# Patient Record
Sex: MALE | Race: WHITE | Employment: OTHER | URBAN - METROPOLITAN AREA
[De-identification: names, ages, dates, MRNs, and addresses within clinical notes are randomized per-mention and may not be internally consistent; named-entity substitution may affect disease eponyms.]

---

## 2022-01-01 ENCOUNTER — HOSPITAL ENCOUNTER (INPATIENT)
Age: 87
LOS: 2 days | DRG: 951 | End: 2022-10-02
Attending: FAMILY MEDICINE | Admitting: FAMILY MEDICINE
Payer: OTHER MISCELLANEOUS

## 2022-01-01 ENCOUNTER — APPOINTMENT (OUTPATIENT)
Dept: GENERAL RADIOLOGY | Age: 87
DRG: 070 | End: 2022-01-01
Attending: EMERGENCY MEDICINE
Payer: MEDICARE

## 2022-01-01 ENCOUNTER — APPOINTMENT (OUTPATIENT)
Dept: ULTRASOUND IMAGING | Age: 87
DRG: 070 | End: 2022-01-01
Attending: HOSPITALIST
Payer: MEDICARE

## 2022-01-01 ENCOUNTER — HOSPICE ADMISSION (OUTPATIENT)
Dept: HOSPICE | Facility: HOSPICE | Age: 87
End: 2022-01-01
Payer: MEDICARE

## 2022-01-01 ENCOUNTER — APPOINTMENT (OUTPATIENT)
Dept: CT IMAGING | Age: 87
DRG: 070 | End: 2022-01-01
Attending: STUDENT IN AN ORGANIZED HEALTH CARE EDUCATION/TRAINING PROGRAM
Payer: MEDICARE

## 2022-01-01 ENCOUNTER — APPOINTMENT (OUTPATIENT)
Dept: CT IMAGING | Age: 87
DRG: 070 | End: 2022-01-01
Attending: EMERGENCY MEDICINE
Payer: MEDICARE

## 2022-01-01 ENCOUNTER — HOSPITAL ENCOUNTER (INPATIENT)
Age: 87
LOS: 5 days | Discharge: HOSPICE | DRG: 070 | End: 2022-09-30
Attending: EMERGENCY MEDICINE | Admitting: INTERNAL MEDICINE
Payer: MEDICARE

## 2022-01-01 VITALS
WEIGHT: 136.91 LBS | OXYGEN SATURATION: 96 % | TEMPERATURE: 97.8 F | HEART RATE: 85 BPM | HEIGHT: 74 IN | SYSTOLIC BLOOD PRESSURE: 105 MMHG | RESPIRATION RATE: 18 BRPM | DIASTOLIC BLOOD PRESSURE: 77 MMHG | BODY MASS INDEX: 17.57 KG/M2

## 2022-01-01 VITALS
SYSTOLIC BLOOD PRESSURE: 96 MMHG | WEIGHT: 136.69 LBS | HEIGHT: 74 IN | BODY MASS INDEX: 17.54 KG/M2 | RESPIRATION RATE: 22 BRPM | OXYGEN SATURATION: 90 % | HEART RATE: 112 BPM | TEMPERATURE: 97.8 F | DIASTOLIC BLOOD PRESSURE: 60 MMHG

## 2022-01-01 DIAGNOSIS — R53.81 DEBILITY: ICD-10-CM

## 2022-01-01 DIAGNOSIS — R65.20 SEPSIS WITH ENCEPHALOPATHY WITHOUT SEPTIC SHOCK, DUE TO UNSPECIFIED ORGANISM (HCC): Primary | ICD-10-CM

## 2022-01-01 DIAGNOSIS — A41.9 SEPSIS WITH ENCEPHALOPATHY WITHOUT SEPTIC SHOCK, DUE TO UNSPECIFIED ORGANISM (HCC): Primary | ICD-10-CM

## 2022-01-01 DIAGNOSIS — G93.40 ENCEPHALOPATHY: ICD-10-CM

## 2022-01-01 DIAGNOSIS — Z51.5 PALLIATIVE CARE ENCOUNTER: ICD-10-CM

## 2022-01-01 DIAGNOSIS — G93.40 SEPSIS WITH ENCEPHALOPATHY WITHOUT SEPTIC SHOCK, DUE TO UNSPECIFIED ORGANISM (HCC): Primary | ICD-10-CM

## 2022-01-01 DIAGNOSIS — N17.9 ACUTE KIDNEY INJURY (HCC): ICD-10-CM

## 2022-01-01 LAB
ALBUMIN SERPL-MCNC: 1.8 G/DL (ref 3.5–5)
ALBUMIN SERPL-MCNC: 3.6 G/DL (ref 3.5–5)
ALBUMIN/GLOB SERPL: 1.1 {RATIO} (ref 1.1–2.2)
ALP SERPL-CCNC: 49 U/L (ref 45–117)
ALT SERPL-CCNC: 17 U/L (ref 12–78)
ANION GAP SERPL CALC-SCNC: 10 MMOL/L (ref 5–15)
ANION GAP SERPL CALC-SCNC: 11 MMOL/L (ref 5–15)
ANION GAP SERPL CALC-SCNC: 14 MMOL/L (ref 5–15)
ANION GAP SERPL CALC-SCNC: 7 MMOL/L (ref 5–15)
ANION GAP SERPL CALC-SCNC: 7 MMOL/L (ref 5–15)
APPEARANCE UR: ABNORMAL
AST SERPL-CCNC: 14 U/L (ref 15–37)
BACTERIA SPEC CULT: NORMAL
BACTERIA URNS QL MICRO: NEGATIVE /HPF
BASOPHILS # BLD: 0 K/UL (ref 0–0.1)
BASOPHILS NFR BLD: 0 % (ref 0–1)
BILIRUB SERPL-MCNC: 0.7 MG/DL (ref 0.2–1)
BILIRUB UR QL: NEGATIVE
BUN SERPL-MCNC: 25 MG/DL (ref 6–20)
BUN SERPL-MCNC: 59 MG/DL (ref 6–20)
BUN SERPL-MCNC: 75 MG/DL (ref 6–20)
BUN SERPL-MCNC: 83 MG/DL (ref 6–20)
BUN SERPL-MCNC: 96 MG/DL (ref 6–20)
BUN/CREAT SERPL: 14 (ref 12–20)
BUN/CREAT SERPL: 14 (ref 12–20)
BUN/CREAT SERPL: 15 (ref 12–20)
BUN/CREAT SERPL: 16 (ref 12–20)
BUN/CREAT SERPL: 19 (ref 12–20)
CALCIUM SERPL-MCNC: 7.6 MG/DL (ref 8.5–10.1)
CALCIUM SERPL-MCNC: 7.9 MG/DL (ref 8.5–10.1)
CALCIUM SERPL-MCNC: 8 MG/DL (ref 8.5–10.1)
CALCIUM SERPL-MCNC: 8.1 MG/DL (ref 8.5–10.1)
CALCIUM SERPL-MCNC: 8.2 MG/DL (ref 8.5–10.1)
CAOX CRY URNS QL MICRO: ABNORMAL
CHLORIDE SERPL-SCNC: 100 MMOL/L (ref 97–108)
CHLORIDE SERPL-SCNC: 104 MMOL/L (ref 97–108)
CHLORIDE SERPL-SCNC: 106 MMOL/L (ref 97–108)
CHLORIDE SERPL-SCNC: 111 MMOL/L (ref 97–108)
CHLORIDE SERPL-SCNC: 114 MMOL/L (ref 97–108)
CO2 SERPL-SCNC: 19 MMOL/L (ref 21–32)
CO2 SERPL-SCNC: 20 MMOL/L (ref 21–32)
CO2 SERPL-SCNC: 21 MMOL/L (ref 21–32)
CO2 SERPL-SCNC: 22 MMOL/L (ref 21–32)
CO2 SERPL-SCNC: 24 MMOL/L (ref 21–32)
COLOR UR: ABNORMAL
CREAT SERPL-MCNC: 1.32 MG/DL (ref 0.7–1.3)
CREAT SERPL-MCNC: 3.76 MG/DL (ref 0.7–1.3)
CREAT SERPL-MCNC: 4.84 MG/DL (ref 0.7–1.3)
CREAT SERPL-MCNC: 5.92 MG/DL (ref 0.7–1.3)
CREAT SERPL-MCNC: 6.66 MG/DL (ref 0.7–1.3)
DIFFERENTIAL METHOD BLD: ABNORMAL
EOSINOPHIL # BLD: 0 K/UL (ref 0–0.4)
EOSINOPHIL # BLD: 0.2 K/UL (ref 0–0.4)
EOSINOPHIL NFR BLD: 0 % (ref 0–7)
EOSINOPHIL NFR BLD: 2 % (ref 0–7)
EPITH CASTS URNS QL MICRO: ABNORMAL /LPF
ERYTHROCYTE [DISTWIDTH] IN BLOOD BY AUTOMATED COUNT: 12.9 % (ref 11.5–14.5)
ERYTHROCYTE [DISTWIDTH] IN BLOOD BY AUTOMATED COUNT: 13.3 % (ref 11.5–14.5)
ERYTHROCYTE [DISTWIDTH] IN BLOOD BY AUTOMATED COUNT: 13.3 % (ref 11.5–14.5)
ERYTHROCYTE [DISTWIDTH] IN BLOOD BY AUTOMATED COUNT: 13.5 % (ref 11.5–14.5)
GLOBULIN SER CALC-MCNC: 3.3 G/DL (ref 2–4)
GLUCOSE BLD STRIP.AUTO-MCNC: 97 MG/DL (ref 65–117)
GLUCOSE SERPL-MCNC: 107 MG/DL (ref 65–100)
GLUCOSE SERPL-MCNC: 79 MG/DL (ref 65–100)
GLUCOSE SERPL-MCNC: 88 MG/DL (ref 65–100)
GLUCOSE SERPL-MCNC: 93 MG/DL (ref 65–100)
GLUCOSE SERPL-MCNC: 96 MG/DL (ref 65–100)
GLUCOSE UR STRIP.AUTO-MCNC: 100 MG/DL
HCT VFR BLD AUTO: 35.9 % (ref 36.6–50.3)
HCT VFR BLD AUTO: 39 % (ref 36.6–50.3)
HCT VFR BLD AUTO: 40.3 % (ref 36.6–50.3)
HCT VFR BLD AUTO: 44.2 % (ref 36.6–50.3)
HGB BLD-MCNC: 12.1 G/DL (ref 12.1–17)
HGB BLD-MCNC: 13.1 G/DL (ref 12.1–17)
HGB BLD-MCNC: 13.1 G/DL (ref 12.1–17)
HGB BLD-MCNC: 14.4 G/DL (ref 12.1–17)
HGB UR QL STRIP: ABNORMAL
IMM GRANULOCYTES # BLD AUTO: 0 K/UL (ref 0–0.04)
IMM GRANULOCYTES # BLD AUTO: 0.1 K/UL (ref 0–0.04)
IMM GRANULOCYTES NFR BLD AUTO: 0 % (ref 0–0.5)
IMM GRANULOCYTES NFR BLD AUTO: 1 % (ref 0–0.5)
KETONES UR QL STRIP.AUTO: 15 MG/DL
LACTATE BLD-SCNC: 0.87 MMOL/L (ref 0.4–2)
LACTATE SERPL-SCNC: 2.6 MMOL/L (ref 0.4–2)
LEUKOCYTE ESTERASE UR QL STRIP.AUTO: NEGATIVE
LIPASE SERPL-CCNC: 173 U/L (ref 73–393)
LYMPHOCYTES # BLD: 0.7 K/UL (ref 0.8–3.5)
LYMPHOCYTES # BLD: 0.7 K/UL (ref 0.8–3.5)
LYMPHOCYTES # BLD: 1 K/UL (ref 0.8–3.5)
LYMPHOCYTES # BLD: 1 K/UL (ref 0.8–3.5)
LYMPHOCYTES NFR BLD: 6 % (ref 12–49)
LYMPHOCYTES NFR BLD: 7 % (ref 12–49)
LYMPHOCYTES NFR BLD: 8 % (ref 12–49)
LYMPHOCYTES NFR BLD: 9 % (ref 12–49)
MCH RBC QN AUTO: 30.3 PG (ref 26–34)
MCH RBC QN AUTO: 30.5 PG (ref 26–34)
MCH RBC QN AUTO: 30.6 PG (ref 26–34)
MCH RBC QN AUTO: 30.9 PG (ref 26–34)
MCHC RBC AUTO-ENTMCNC: 32.5 G/DL (ref 30–36.5)
MCHC RBC AUTO-ENTMCNC: 32.6 G/DL (ref 30–36.5)
MCHC RBC AUTO-ENTMCNC: 33.6 G/DL (ref 30–36.5)
MCHC RBC AUTO-ENTMCNC: 33.7 G/DL (ref 30–36.5)
MCV RBC AUTO: 90.9 FL (ref 80–99)
MCV RBC AUTO: 91.8 FL (ref 80–99)
MCV RBC AUTO: 93.1 FL (ref 80–99)
MCV RBC AUTO: 94.2 FL (ref 80–99)
MONOCYTES # BLD: 0.7 K/UL (ref 0–1)
MONOCYTES # BLD: 1 K/UL (ref 0–1)
MONOCYTES # BLD: 1 K/UL (ref 0–1)
MONOCYTES # BLD: 1.1 K/UL (ref 0–1)
MONOCYTES NFR BLD: 10 % (ref 5–13)
MONOCYTES NFR BLD: 6 % (ref 5–13)
MONOCYTES NFR BLD: 8 % (ref 5–13)
MONOCYTES NFR BLD: 9 % (ref 5–13)
NEUTS SEG # BLD: 10.7 K/UL (ref 1.8–8)
NEUTS SEG # BLD: 7.6 K/UL (ref 1.8–8)
NEUTS SEG # BLD: 9.1 K/UL (ref 1.8–8)
NEUTS SEG # BLD: 9.5 K/UL (ref 1.8–8)
NEUTS SEG NFR BLD: 80 % (ref 32–75)
NEUTS SEG NFR BLD: 83 % (ref 32–75)
NEUTS SEG NFR BLD: 84 % (ref 32–75)
NEUTS SEG NFR BLD: 85 % (ref 32–75)
NITRITE UR QL STRIP.AUTO: NEGATIVE
NRBC # BLD: 0 K/UL (ref 0–0.01)
NRBC BLD-RTO: 0 PER 100 WBC
PH UR STRIP: 6 [PH] (ref 5–8)
PHOSPHATE SERPL-MCNC: 4.2 MG/DL (ref 2.6–4.7)
PLATELET # BLD AUTO: 196 K/UL (ref 150–400)
PLATELET # BLD AUTO: 217 K/UL (ref 150–400)
PLATELET # BLD AUTO: 217 K/UL (ref 150–400)
PLATELET # BLD AUTO: 282 K/UL (ref 150–400)
PMV BLD AUTO: 10.1 FL (ref 8.9–12.9)
PMV BLD AUTO: 10.2 FL (ref 8.9–12.9)
PMV BLD AUTO: 10.5 FL (ref 8.9–12.9)
PMV BLD AUTO: 9.9 FL (ref 8.9–12.9)
POTASSIUM SERPL-SCNC: 4.3 MMOL/L (ref 3.5–5.1)
POTASSIUM SERPL-SCNC: 4.4 MMOL/L (ref 3.5–5.1)
POTASSIUM SERPL-SCNC: 4.4 MMOL/L (ref 3.5–5.1)
POTASSIUM SERPL-SCNC: 4.5 MMOL/L (ref 3.5–5.1)
POTASSIUM SERPL-SCNC: 4.9 MMOL/L (ref 3.5–5.1)
PROCALCITONIN SERPL-MCNC: 3.9 NG/ML
PROT SERPL-MCNC: 6.9 G/DL (ref 6.4–8.2)
PROT UR STRIP-MCNC: 30 MG/DL
RBC # BLD AUTO: 3.91 M/UL (ref 4.1–5.7)
RBC # BLD AUTO: 4.28 M/UL (ref 4.1–5.7)
RBC # BLD AUTO: 4.29 M/UL (ref 4.1–5.7)
RBC # BLD AUTO: 4.75 M/UL (ref 4.1–5.7)
RBC #/AREA URNS HPF: ABNORMAL /HPF (ref 0–5)
RBC MORPH BLD: ABNORMAL
SERVICE CMNT-IMP: NORMAL
SERVICE CMNT-IMP: NORMAL
SODIUM SERPL-SCNC: 136 MMOL/L (ref 136–145)
SODIUM SERPL-SCNC: 137 MMOL/L (ref 136–145)
SODIUM SERPL-SCNC: 137 MMOL/L (ref 136–145)
SODIUM SERPL-SCNC: 138 MMOL/L (ref 136–145)
SODIUM SERPL-SCNC: 142 MMOL/L (ref 136–145)
SP GR UR REFRACTOMETRY: 1.01 (ref 1–1.03)
TROPONIN-HIGH SENSITIVITY: 9 NG/L (ref 0–76)
UR CULT HOLD, URHOLD: NORMAL
UROBILINOGEN UR QL STRIP.AUTO: 0.2 EU/DL (ref 0.2–1)
WBC # BLD AUTO: 10.9 K/UL (ref 4.1–11.1)
WBC # BLD AUTO: 11.2 K/UL (ref 4.1–11.1)
WBC # BLD AUTO: 12.9 K/UL (ref 4.1–11.1)
WBC # BLD AUTO: 9.6 K/UL (ref 4.1–11.1)
WBC URNS QL MICRO: ABNORMAL /HPF (ref 0–4)

## 2022-01-01 PROCEDURE — 92526 ORAL FUNCTION THERAPY: CPT

## 2022-01-01 PROCEDURE — 96374 THER/PROPH/DIAG INJ IV PUSH: CPT

## 2022-01-01 PROCEDURE — 36415 COLL VENOUS BLD VENIPUNCTURE: CPT

## 2022-01-01 PROCEDURE — 65270000046 HC RM TELEMETRY

## 2022-01-01 PROCEDURE — 97535 SELF CARE MNGMENT TRAINING: CPT

## 2022-01-01 PROCEDURE — 74011250636 HC RX REV CODE- 250/636: Performed by: HOSPITALIST

## 2022-01-01 PROCEDURE — C9113 INJ PANTOPRAZOLE SODIUM, VIA: HCPCS | Performed by: HOSPITALIST

## 2022-01-01 PROCEDURE — 74011000250 HC RX REV CODE- 250: Performed by: HOSPITALIST

## 2022-01-01 PROCEDURE — 97165 OT EVAL LOW COMPLEX 30 MIN: CPT

## 2022-01-01 PROCEDURE — 83605 ASSAY OF LACTIC ACID: CPT

## 2022-01-01 PROCEDURE — 97116 GAIT TRAINING THERAPY: CPT

## 2022-01-01 PROCEDURE — 96375 TX/PRO/DX INJ NEW DRUG ADDON: CPT

## 2022-01-01 PROCEDURE — 76770 US EXAM ABDO BACK WALL COMP: CPT

## 2022-01-01 PROCEDURE — 80048 BASIC METABOLIC PNL TOTAL CA: CPT

## 2022-01-01 PROCEDURE — 74011250637 HC RX REV CODE- 250/637: Performed by: FAMILY MEDICINE

## 2022-01-01 PROCEDURE — 85025 COMPLETE CBC W/AUTO DIFF WBC: CPT

## 2022-01-01 PROCEDURE — 74011000250 HC RX REV CODE- 250: Performed by: EMERGENCY MEDICINE

## 2022-01-01 PROCEDURE — 97530 THERAPEUTIC ACTIVITIES: CPT

## 2022-01-01 PROCEDURE — 70450 CT HEAD/BRAIN W/O DYE: CPT

## 2022-01-01 PROCEDURE — 84145 PROCALCITONIN (PCT): CPT

## 2022-01-01 PROCEDURE — 74011000250 HC RX REV CODE- 250: Performed by: INTERNAL MEDICINE

## 2022-01-01 PROCEDURE — 80069 RENAL FUNCTION PANEL: CPT

## 2022-01-01 PROCEDURE — 99285 EMERGENCY DEPT VISIT HI MDM: CPT

## 2022-01-01 PROCEDURE — 74011250636 HC RX REV CODE- 250/636: Performed by: INTERNAL MEDICINE

## 2022-01-01 PROCEDURE — 83690 ASSAY OF LIPASE: CPT

## 2022-01-01 PROCEDURE — 0656 HSPC GENERAL INPATIENT

## 2022-01-01 PROCEDURE — 74011000250 HC RX REV CODE- 250: Performed by: STUDENT IN AN ORGANIZED HEALTH CARE EDUCATION/TRAINING PROGRAM

## 2022-01-01 PROCEDURE — 74011000258 HC RX REV CODE- 258: Performed by: HOSPITALIST

## 2022-01-01 PROCEDURE — 65270000032 HC RM SEMIPRIVATE

## 2022-01-01 PROCEDURE — 74011250636 HC RX REV CODE- 250/636: Performed by: FAMILY MEDICINE

## 2022-01-01 PROCEDURE — 97161 PT EVAL LOW COMPLEX 20 MIN: CPT

## 2022-01-01 PROCEDURE — 74011250636 HC RX REV CODE- 250/636: Performed by: PHYSICAL MEDICINE & REHABILITATION

## 2022-01-01 PROCEDURE — 74011250636 HC RX REV CODE- 250/636: Performed by: STUDENT IN AN ORGANIZED HEALTH CARE EDUCATION/TRAINING PROGRAM

## 2022-01-01 PROCEDURE — 74011000250 HC RX REV CODE- 250: Performed by: FAMILY MEDICINE

## 2022-01-01 PROCEDURE — 81001 URINALYSIS AUTO W/SCOPE: CPT

## 2022-01-01 PROCEDURE — 65270000029 HC RM PRIVATE

## 2022-01-01 PROCEDURE — 84484 ASSAY OF TROPONIN QUANT: CPT

## 2022-01-01 PROCEDURE — 80053 COMPREHEN METABOLIC PANEL: CPT

## 2022-01-01 PROCEDURE — 71045 X-RAY EXAM CHEST 1 VIEW: CPT

## 2022-01-01 PROCEDURE — 92610 EVALUATE SWALLOWING FUNCTION: CPT | Performed by: SPEECH-LANGUAGE PATHOLOGIST

## 2022-01-01 PROCEDURE — 74176 CT ABD & PELVIS W/O CONTRAST: CPT

## 2022-01-01 PROCEDURE — 74011250636 HC RX REV CODE- 250/636: Performed by: NURSE PRACTITIONER

## 2022-01-01 PROCEDURE — 74011250636 HC RX REV CODE- 250/636: Performed by: EMERGENCY MEDICINE

## 2022-01-01 PROCEDURE — 97535 SELF CARE MNGMENT TRAINING: CPT | Performed by: OCCUPATIONAL THERAPIST

## 2022-01-01 PROCEDURE — 87040 BLOOD CULTURE FOR BACTERIA: CPT

## 2022-01-01 PROCEDURE — 82962 GLUCOSE BLOOD TEST: CPT

## 2022-01-01 PROCEDURE — 99223 1ST HOSP IP/OBS HIGH 75: CPT | Performed by: PHYSICAL MEDICINE & REHABILITATION

## 2022-01-01 RX ORDER — HYDROMORPHONE HYDROCHLORIDE 1 MG/ML
0.5 INJECTION, SOLUTION INTRAMUSCULAR; INTRAVENOUS; SUBCUTANEOUS
Status: DISCONTINUED | OUTPATIENT
Start: 2022-01-01 | End: 2022-01-01 | Stop reason: HOSPADM

## 2022-01-01 RX ORDER — HALOPERIDOL 5 MG/ML
2 INJECTION INTRAMUSCULAR
Status: DISCONTINUED | OUTPATIENT
Start: 2022-01-01 | End: 2022-01-01 | Stop reason: HOSPADM

## 2022-01-01 RX ORDER — GLYCOPYRROLATE 0.2 MG/ML
0.2 INJECTION INTRAMUSCULAR; INTRAVENOUS
Status: DISCONTINUED | OUTPATIENT
Start: 2022-01-01 | End: 2022-01-01 | Stop reason: HOSPADM

## 2022-01-01 RX ORDER — LORAZEPAM 2 MG/ML
1 CONCENTRATE ORAL
Status: DISCONTINUED | OUTPATIENT
Start: 2022-01-01 | End: 2022-01-01 | Stop reason: HOSPADM

## 2022-01-01 RX ORDER — ACETAMINOPHEN 325 MG/1
650 TABLET ORAL
Status: DISCONTINUED | OUTPATIENT
Start: 2022-01-01 | End: 2022-01-01 | Stop reason: HOSPADM

## 2022-01-01 RX ORDER — HYDROMORPHONE HYDROCHLORIDE 1 MG/ML
0.5 INJECTION, SOLUTION INTRAMUSCULAR; INTRAVENOUS; SUBCUTANEOUS
Status: DISCONTINUED | OUTPATIENT
Start: 2022-01-01 | End: 2022-01-01

## 2022-01-01 RX ORDER — SODIUM CHLORIDE 0.9 % (FLUSH) 0.9 %
5-40 SYRINGE (ML) INJECTION AS NEEDED
Status: DISCONTINUED | OUTPATIENT
Start: 2022-01-01 | End: 2022-01-01 | Stop reason: HOSPADM

## 2022-01-01 RX ORDER — ACETAMINOPHEN 650 MG/1
650 SUPPOSITORY RECTAL
Status: DISCONTINUED | OUTPATIENT
Start: 2022-01-01 | End: 2022-01-01 | Stop reason: HOSPADM

## 2022-01-01 RX ORDER — DIAZEPAM 10 MG/2ML
10 INJECTION INTRAMUSCULAR ONCE
Status: DISCONTINUED | OUTPATIENT
Start: 2022-01-01 | End: 2022-01-01

## 2022-01-01 RX ORDER — PROCHLORPERAZINE EDISYLATE 5 MG/ML
10 INJECTION INTRAMUSCULAR; INTRAVENOUS
Status: DISCONTINUED | OUTPATIENT
Start: 2022-01-01 | End: 2022-01-01 | Stop reason: HOSPADM

## 2022-01-01 RX ORDER — FACIAL-BODY WIPES
10 EACH TOPICAL DAILY PRN
Status: DISCONTINUED | OUTPATIENT
Start: 2022-01-01 | End: 2022-01-01 | Stop reason: HOSPADM

## 2022-01-01 RX ORDER — ONDANSETRON 2 MG/ML
4 INJECTION INTRAMUSCULAR; INTRAVENOUS
Status: DISCONTINUED | OUTPATIENT
Start: 2022-01-01 | End: 2022-01-01 | Stop reason: HOSPADM

## 2022-01-01 RX ORDER — MIDAZOLAM HYDROCHLORIDE 1 MG/ML
2 INJECTION, SOLUTION INTRAMUSCULAR; INTRAVENOUS
Status: DISCONTINUED | OUTPATIENT
Start: 2022-01-01 | End: 2022-01-01 | Stop reason: HOSPADM

## 2022-01-01 RX ORDER — SODIUM CHLORIDE 0.9 % (FLUSH) 0.9 %
5 SYRINGE (ML) INJECTION AS NEEDED
Status: DISCONTINUED | OUTPATIENT
Start: 2022-01-01 | End: 2022-01-01 | Stop reason: HOSPADM

## 2022-01-01 RX ORDER — BUMETANIDE 0.25 MG/ML
2 INJECTION INTRAMUSCULAR; INTRAVENOUS ONCE
Status: COMPLETED | OUTPATIENT
Start: 2022-01-01 | End: 2022-01-01

## 2022-01-01 RX ORDER — HYDROMORPHONE HYDROCHLORIDE 1 MG/ML
1 INJECTION, SOLUTION INTRAMUSCULAR; INTRAVENOUS; SUBCUTANEOUS
Status: DISCONTINUED | OUTPATIENT
Start: 2022-01-01 | End: 2022-01-01 | Stop reason: HOSPADM

## 2022-01-01 RX ORDER — ONDANSETRON 4 MG/1
4 TABLET, ORALLY DISINTEGRATING ORAL
Status: DISCONTINUED | OUTPATIENT
Start: 2022-01-01 | End: 2022-01-01 | Stop reason: HOSPADM

## 2022-01-01 RX ORDER — SODIUM CHLORIDE 0.9 % (FLUSH) 0.9 %
5-40 SYRINGE (ML) INJECTION EVERY 8 HOURS
Status: DISCONTINUED | OUTPATIENT
Start: 2022-01-01 | End: 2022-01-01 | Stop reason: HOSPADM

## 2022-01-01 RX ORDER — POLYETHYLENE GLYCOL 3350 17 G/17G
17 POWDER, FOR SOLUTION ORAL DAILY PRN
Status: DISCONTINUED | OUTPATIENT
Start: 2022-01-01 | End: 2022-01-01 | Stop reason: HOSPADM

## 2022-01-01 RX ORDER — SODIUM CHLORIDE 9 MG/ML
100 INJECTION, SOLUTION INTRAVENOUS CONTINUOUS
Status: DISPENSED | OUTPATIENT
Start: 2022-01-01 | End: 2022-01-01

## 2022-01-01 RX ORDER — SODIUM CHLORIDE 9 MG/ML
150 INJECTION, SOLUTION INTRAVENOUS CONTINUOUS
Status: DISPENSED | OUTPATIENT
Start: 2022-01-01 | End: 2022-01-01

## 2022-01-01 RX ORDER — LIDOCAINE HYDROCHLORIDE 20 MG/ML
JELLY TOPICAL
Status: COMPLETED | OUTPATIENT
Start: 2022-01-01 | End: 2022-01-01

## 2022-01-01 RX ORDER — ONDANSETRON 2 MG/ML
4 INJECTION INTRAMUSCULAR; INTRAVENOUS
Status: COMPLETED | OUTPATIENT
Start: 2022-01-01 | End: 2022-01-01

## 2022-01-01 RX ORDER — KETOROLAC TROMETHAMINE 30 MG/ML
15 INJECTION, SOLUTION INTRAMUSCULAR; INTRAVENOUS
Status: DISCONTINUED | OUTPATIENT
Start: 2022-01-01 | End: 2022-01-01 | Stop reason: HOSPADM

## 2022-01-01 RX ORDER — LEVOFLOXACIN 5 MG/ML
750 INJECTION, SOLUTION INTRAVENOUS ONCE
Status: COMPLETED | OUTPATIENT
Start: 2022-01-01 | End: 2022-01-01

## 2022-01-01 RX ORDER — DIAZEPAM 10 MG/2ML
10 INJECTION INTRAMUSCULAR EVERY 6 HOURS
Status: DISCONTINUED | OUTPATIENT
Start: 2022-01-01 | End: 2022-01-01 | Stop reason: HOSPADM

## 2022-01-01 RX ORDER — BALSAM PERU/CASTOR OIL
OINTMENT (GRAM) TOPICAL 2 TIMES DAILY
Status: DISCONTINUED | OUTPATIENT
Start: 2022-01-01 | End: 2022-01-01 | Stop reason: HOSPADM

## 2022-01-01 RX ORDER — DIAZEPAM 10 MG/2ML
5 INJECTION INTRAMUSCULAR EVERY 6 HOURS
Status: DISCONTINUED | OUTPATIENT
Start: 2022-01-01 | End: 2022-01-01

## 2022-01-01 RX ORDER — HALOPERIDOL 2 MG/ML
2 SOLUTION ORAL
Status: DISCONTINUED | OUTPATIENT
Start: 2022-01-01 | End: 2022-01-01 | Stop reason: HOSPADM

## 2022-01-01 RX ADMIN — DIAZEPAM 5 MG: 5 INJECTION, SOLUTION INTRAMUSCULAR; INTRAVENOUS at 18:27

## 2022-01-01 RX ADMIN — SODIUM CHLORIDE, PRESERVATIVE FREE 10 ML: 5 INJECTION INTRAVENOUS at 22:54

## 2022-01-01 RX ADMIN — HYDROMORPHONE HYDROCHLORIDE 0.5 MG: 1 INJECTION, SOLUTION INTRAMUSCULAR; INTRAVENOUS; SUBCUTANEOUS at 15:18

## 2022-01-01 RX ADMIN — SODIUM CHLORIDE 150 ML/HR: 9 INJECTION, SOLUTION INTRAVENOUS at 08:37

## 2022-01-01 RX ADMIN — Medication: at 18:52

## 2022-01-01 RX ADMIN — PANTOPRAZOLE SODIUM 40 MG: 40 INJECTION, POWDER, FOR SOLUTION INTRAVENOUS at 09:46

## 2022-01-01 RX ADMIN — HYDROMORPHONE HYDROCHLORIDE 0.5 MG: 1 INJECTION, SOLUTION INTRAMUSCULAR; INTRAVENOUS; SUBCUTANEOUS at 01:01

## 2022-01-01 RX ADMIN — SODIUM CHLORIDE 1000 ML: 9 INJECTION, SOLUTION INTRAVENOUS at 16:44

## 2022-01-01 RX ADMIN — SODIUM CHLORIDE 150 ML/HR: 9 INJECTION, SOLUTION INTRAVENOUS at 03:57

## 2022-01-01 RX ADMIN — HYDROMORPHONE HYDROCHLORIDE 1 MG: 1 INJECTION, SOLUTION INTRAMUSCULAR; INTRAVENOUS; SUBCUTANEOUS at 00:41

## 2022-01-01 RX ADMIN — DIAZEPAM 10 MG: 5 INJECTION, SOLUTION INTRAMUSCULAR; INTRAVENOUS at 06:24

## 2022-01-01 RX ADMIN — CEFEPIME 1 G: 1 INJECTION, POWDER, FOR SOLUTION INTRAMUSCULAR; INTRAVENOUS at 22:18

## 2022-01-01 RX ADMIN — SODIUM CHLORIDE 150 ML/HR: 9 INJECTION, SOLUTION INTRAVENOUS at 18:53

## 2022-01-01 RX ADMIN — HYDROMORPHONE HYDROCHLORIDE 0.5 MG: 1 INJECTION, SOLUTION INTRAMUSCULAR; INTRAVENOUS; SUBCUTANEOUS at 12:48

## 2022-01-01 RX ADMIN — SODIUM CHLORIDE 2 G: 9 INJECTION INTRAMUSCULAR; INTRAVENOUS; SUBCUTANEOUS at 10:04

## 2022-01-01 RX ADMIN — CEFEPIME 1 G: 1 INJECTION, POWDER, FOR SOLUTION INTRAMUSCULAR; INTRAVENOUS at 10:00

## 2022-01-01 RX ADMIN — PANTOPRAZOLE SODIUM 40 MG: 40 INJECTION, POWDER, FOR SOLUTION INTRAVENOUS at 08:34

## 2022-01-01 RX ADMIN — DIAZEPAM 10 MG: 5 INJECTION, SOLUTION INTRAMUSCULAR; INTRAVENOUS at 12:31

## 2022-01-01 RX ADMIN — HYDROMORPHONE HYDROCHLORIDE 0.5 MG: 1 INJECTION, SOLUTION INTRAMUSCULAR; INTRAVENOUS; SUBCUTANEOUS at 04:07

## 2022-01-01 RX ADMIN — SODIUM CHLORIDE, PRESERVATIVE FREE 10 ML: 5 INJECTION INTRAVENOUS at 06:10

## 2022-01-01 RX ADMIN — SODIUM CHLORIDE, PRESERVATIVE FREE 10 ML: 5 INJECTION INTRAVENOUS at 13:55

## 2022-01-01 RX ADMIN — SODIUM CHLORIDE, PRESERVATIVE FREE 5 ML: 5 INJECTION INTRAVENOUS at 09:43

## 2022-01-01 RX ADMIN — HYDROMORPHONE HYDROCHLORIDE 1 MG: 1 INJECTION, SOLUTION INTRAMUSCULAR; INTRAVENOUS; SUBCUTANEOUS at 15:50

## 2022-01-01 RX ADMIN — CEFEPIME 1 G: 1 INJECTION, POWDER, FOR SOLUTION INTRAMUSCULAR; INTRAVENOUS at 09:04

## 2022-01-01 RX ADMIN — HALOPERIDOL LACTATE 2 MG: 5 INJECTION, SOLUTION INTRAMUSCULAR at 12:47

## 2022-01-01 RX ADMIN — Medication: at 18:27

## 2022-01-01 RX ADMIN — Medication: at 09:43

## 2022-01-01 RX ADMIN — HYDROMORPHONE HYDROCHLORIDE 1 MG: 1 INJECTION, SOLUTION INTRAMUSCULAR; INTRAVENOUS; SUBCUTANEOUS at 21:55

## 2022-01-01 RX ADMIN — HYDROMORPHONE HYDROCHLORIDE 1 MG: 1 INJECTION, SOLUTION INTRAMUSCULAR; INTRAVENOUS; SUBCUTANEOUS at 18:52

## 2022-01-01 RX ADMIN — SODIUM CHLORIDE 1000 ML: 9 INJECTION, SOLUTION INTRAVENOUS at 18:55

## 2022-01-01 RX ADMIN — HYDROMORPHONE HYDROCHLORIDE 0.5 MG: 1 INJECTION, SOLUTION INTRAMUSCULAR; INTRAVENOUS; SUBCUTANEOUS at 22:39

## 2022-01-01 RX ADMIN — SODIUM CHLORIDE 150 ML/HR: 9 INJECTION, SOLUTION INTRAVENOUS at 17:54

## 2022-01-01 RX ADMIN — HYDROMORPHONE HYDROCHLORIDE 0.5 MG: 1 INJECTION, SOLUTION INTRAMUSCULAR; INTRAVENOUS; SUBCUTANEOUS at 06:54

## 2022-01-01 RX ADMIN — HYDROMORPHONE HYDROCHLORIDE 0.5 MG: 1 INJECTION, SOLUTION INTRAMUSCULAR; INTRAVENOUS; SUBCUTANEOUS at 09:43

## 2022-01-01 RX ADMIN — DIAZEPAM 5 MG: 5 INJECTION, SOLUTION INTRAMUSCULAR; INTRAVENOUS at 01:01

## 2022-01-01 RX ADMIN — CEFEPIME 2 G: 2 INJECTION, POWDER, FOR SOLUTION INTRAVENOUS at 18:59

## 2022-01-01 RX ADMIN — SODIUM CHLORIDE, PRESERVATIVE FREE 10 ML: 5 INJECTION INTRAVENOUS at 14:00

## 2022-01-01 RX ADMIN — LIDOCAINE HYDROCHLORIDE: 20 JELLY TOPICAL at 21:08

## 2022-01-01 RX ADMIN — HYDROMORPHONE HYDROCHLORIDE 1 MG: 1 INJECTION, SOLUTION INTRAMUSCULAR; INTRAVENOUS; SUBCUTANEOUS at 03:59

## 2022-01-01 RX ADMIN — ONDANSETRON 4 MG: 2 INJECTION INTRAMUSCULAR; INTRAVENOUS at 16:38

## 2022-01-01 RX ADMIN — SODIUM CHLORIDE 100 ML/HR: 9 INJECTION, SOLUTION INTRAVENOUS at 09:59

## 2022-01-01 RX ADMIN — SODIUM CHLORIDE 2 G: 9 INJECTION INTRAMUSCULAR; INTRAVENOUS; SUBCUTANEOUS at 09:45

## 2022-01-01 RX ADMIN — DIAZEPAM 5 MG: 5 INJECTION, SOLUTION INTRAMUSCULAR; INTRAVENOUS at 06:54

## 2022-01-01 RX ADMIN — LEVOFLOXACIN 750 MG: 5 INJECTION, SOLUTION INTRAVENOUS at 19:00

## 2022-01-01 RX ADMIN — PANTOPRAZOLE SODIUM 40 MG: 40 INJECTION, POWDER, FOR SOLUTION INTRAVENOUS at 10:00

## 2022-01-01 RX ADMIN — DIAZEPAM 10 MG: 5 INJECTION, SOLUTION INTRAMUSCULAR; INTRAVENOUS at 00:41

## 2022-01-01 RX ADMIN — HYDROMORPHONE HYDROCHLORIDE 0.5 MG: 1 INJECTION, SOLUTION INTRAMUSCULAR; INTRAVENOUS; SUBCUTANEOUS at 18:28

## 2022-01-01 RX ADMIN — HYDROMORPHONE HYDROCHLORIDE 1 MG: 1 INJECTION, SOLUTION INTRAMUSCULAR; INTRAVENOUS; SUBCUTANEOUS at 12:30

## 2022-01-01 RX ADMIN — SODIUM CHLORIDE, PRESERVATIVE FREE 10 ML: 5 INJECTION INTRAVENOUS at 22:13

## 2022-01-01 RX ADMIN — HYDROMORPHONE HYDROCHLORIDE 1 MG: 1 INJECTION, SOLUTION INTRAMUSCULAR; INTRAVENOUS; SUBCUTANEOUS at 06:24

## 2022-01-01 RX ADMIN — PANTOPRAZOLE SODIUM 40 MG: 40 INJECTION, POWDER, FOR SOLUTION INTRAVENOUS at 08:37

## 2022-01-01 RX ADMIN — SODIUM CHLORIDE, PRESERVATIVE FREE 10 ML: 5 INJECTION INTRAVENOUS at 13:43

## 2022-01-01 RX ADMIN — SODIUM CHLORIDE 500 ML: 9 INJECTION, SOLUTION INTRAVENOUS at 12:25

## 2022-01-01 RX ADMIN — SODIUM CHLORIDE 75 ML/HR: 9 INJECTION, SOLUTION INTRAVENOUS at 12:25

## 2022-01-01 RX ADMIN — DIAZEPAM 10 MG: 5 INJECTION, SOLUTION INTRAMUSCULAR; INTRAVENOUS at 18:52

## 2022-01-01 RX ADMIN — BUMETANIDE 2 MG: 0.25 INJECTION, SOLUTION INTRAMUSCULAR; INTRAVENOUS at 10:04

## 2022-01-01 RX ADMIN — SODIUM CHLORIDE 150 ML/HR: 9 INJECTION, SOLUTION INTRAVENOUS at 01:39

## 2022-09-25 PROBLEM — A41.9 SEPSIS (HCC): Status: ACTIVE | Noted: 2022-01-01
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
## 2022-09-26 NOTE — PROGRESS NOTES
Admission Medication Reconciliation:    Information obtained from:  patient's son  RxQuery data available¹:  YES    Comments/Recommendations: All medications/allergies have been reviewed and updated. The patient's son reports he is not taking any medications. Insurance claim information confirms that the patient has not had any medications filled I the last 180 days. Changes made to Prior to Admission (PTA) Medication List:   ?   Medications Added:   - None   ? Medications Changed:   - None   ? Medications Removed:   - None     ¹RxQuery pharmacy benefit data reflects medications filled and processed through the patient's insurance, however   this data does NOT capture whether the medication was picked up or is currently being taken by the patient. Allergies:  Patient has no known allergies. Significant PMH/Disease States:   History reviewed. No pertinent past medical history. Chief Complaint for this Admission:    Chief Complaint   Patient presents with    Vomiting       Prior to Admission Medications:   None         Thank you for allowing pharmacy to participate in the coordination of this patient's care. If you have any other questions, please contact the medication reconciliation pharmacist at x [phone]. Phuong Crawley, Pharm. D., BCPS

## 2022-09-26 NOTE — PROGRESS NOTES
Problem: Falls - Risk of  Goal: *Absence of Falls  Description: Document Dawn Fail Fall Risk and appropriate interventions in the flowsheet.   Outcome: Progressing Towards Goal  Note: Fall Risk Interventions:  Mobility Interventions: Bed/chair exit alarm    Mentation Interventions: Bed/chair exit alarm         Elimination Interventions: Bed/chair exit alarm

## 2022-09-26 NOTE — H&P
History & Physical    Primary Care Provider: Kenyon Johnson MD  Source of Information: Patient chart    Please note that this dictation was completed with Ecinity, the computer voice recognition software. Quite often unanticipated grammatical, syntax, homophones, and other interpretive errors are inadvertently transcribed by the computer software. Please disregard these errors. Please excuse any errors that have escaped final proofreading. History of Presenting Illness:   Tana Riojas is a 80 y.o. male who presents with vomiting as per ED chart. Pt is a poor historian he didn't complain of anything . Pt seems to be demented . Called son left , labs reviewed, sepsis was documented by ED but nothing else was done for sepsis. Pt does not appear to be septic as well. Labs and imaging does not seems to have a septic picture. The patient denies any fever, chills, chest pain, cough, congestion, recent illness, palpitations, or dysuria. Review of Systems:  Pertinent items are noted in the History of Present Illness. History reviewed. No pertinent past medical history. History reviewed. No pertinent surgical history. Prior to Admission medications    Not on File     No Known Allergies   History reviewed. No pertinent family history. SOCIAL HISTORY:  Patient resides:  Independently x   Assisted Living    SNF    With family care       Smoking history:   None x   Former    Chronic      Alcohol history:   None x   Social    Chronic      Ambulates:   Independently x   w/cane    w/walker    w/wc    CODE STATUS:  DNR    Full x   Other      Objective:     Physical Exam:     Visit Vitals  /79   Pulse 73   Temp 98.5 °F (36.9 °C)   Resp 25   Ht 6' 2\" (1.88 m)   Wt 62.1 kg (136 lb 14.5 oz)   SpO2 97%   BMI 17.58 kg/m²           General:  Alert, cooperative, no distress, appears stated age. Head:  Normocephalic, without obvious abnormality, atraumatic.    Eyes: Conjunctivae/corneas clear. PERRL, EOMs intact. Nose: Nares normal. Septum midline. Mucosa normal. No drainage or sinus tenderness. Throat: Lips, mucosa, and tongue normal. Teeth and gums normal.   Neck: Supple, symmetrical, trachea midline, no carotid bruit and no JVD. Lungs:   Clear to auscultation bilaterally. Heart:  Regular rate and rhythm, S1, S2 normal, no murmur, click, rub or gallop. Abdomen:   Soft, non-tender. Bowel sounds normal. No masses,  No organomegaly. Extremities: Extremities normal, atraumatic, no cyanosis or edema. Pulses: 2+ and symmetric all extremities. Skin: Skin color, texture, turgor normal. No rashes or lesions   Neurologic: CNII-XII intact. EKG:  normal EKG, normal sinus rhythm. Data Review:     Recent Days:  Recent Labs     09/25/22  1604   WBC 11.2*   HGB 14.4   HCT 44.2        Recent Labs     09/25/22  1604      K 4.3      CO2 24   *   BUN 25*   CREA 1.32*   CA 8.2*   ALB 3.6   TBILI 0.7   ALT 17     No results for input(s): PH, PCO2, PO2, HCO3, FIO2 in the last 72 hours. 24 Hour Results:  Recent Results (from the past 24 hour(s))   CBC WITH AUTOMATED DIFF    Collection Time: 09/25/22  4:04 PM   Result Value Ref Range    WBC 11.2 (H) 4.1 - 11.1 K/uL    RBC 4.75 4.10 - 5.70 M/uL    HGB 14.4 12.1 - 17.0 g/dL    HCT 44.2 36.6 - 50.3 %    MCV 93.1 80.0 - 99.0 FL    MCH 30.3 26.0 - 34.0 PG    MCHC 32.6 30.0 - 36.5 g/dL    RDW 12.9 11.5 - 14.5 %    PLATELET 809 228 - 702 K/uL    MPV 10.1 8.9 - 12.9 FL    NRBC 0.0 0  WBC    ABSOLUTE NRBC 0.00 0.00 - 0.01 K/uL    NEUTROPHILS 85 (H) 32 - 75 %    LYMPHOCYTES 9 (L) 12 - 49 %    MONOCYTES 6 5 - 13 %    EOSINOPHILS 0 0 - 7 %    BASOPHILS 0 0 - 1 %    IMMATURE GRANULOCYTES 0 0.0 - 0.5 %    ABS. NEUTROPHILS 9.5 (H) 1.8 - 8.0 K/UL    ABS. LYMPHOCYTES 1.0 0.8 - 3.5 K/UL    ABS. MONOCYTES 0.7 0.0 - 1.0 K/UL    ABS. EOSINOPHILS 0.0 0.0 - 0.4 K/UL    ABS.  BASOPHILS 0.0 0.0 - 0.1 K/UL    ABS. IMM. GRANS. 0.0 0.00 - 0.04 K/UL    DF AUTOMATED     LIPASE    Collection Time: 09/25/22  4:04 PM   Result Value Ref Range    Lipase 173 73 - 109 U/L   METABOLIC PANEL, COMPREHENSIVE    Collection Time: 09/25/22  4:04 PM   Result Value Ref Range    Sodium 138 136 - 145 mmol/L    Potassium 4.3 3.5 - 5.1 mmol/L    Chloride 100 97 - 108 mmol/L    CO2 24 21 - 32 mmol/L    Anion gap 14 5 - 15 mmol/L    Glucose 107 (H) 65 - 100 mg/dL    BUN 25 (H) 6 - 20 MG/DL    Creatinine 1.32 (H) 0.70 - 1.30 MG/DL    BUN/Creatinine ratio 19 12 - 20      GFR est AA >60 >60 ml/min/1.73m2    GFR est non-AA 51 (L) >60 ml/min/1.73m2    Calcium 8.2 (L) 8.5 - 10.1 MG/DL    Bilirubin, total 0.7 0.2 - 1.0 MG/DL    ALT (SGPT) 17 12 - 78 U/L    AST (SGOT) 14 (L) 15 - 37 U/L    Alk.  phosphatase 49 45 - 117 U/L    Protein, total 6.9 6.4 - 8.2 g/dL    Albumin 3.6 3.5 - 5.0 g/dL    Globulin 3.3 2.0 - 4.0 g/dL    A-G Ratio 1.1 1.1 - 2.2     TROPONIN-HIGH SENSITIVITY    Collection Time: 09/25/22  4:30 PM   Result Value Ref Range    Troponin-High Sensitivity 9 0 - 76 ng/L   LACTIC ACID    Collection Time: 09/25/22  5:12 PM   Result Value Ref Range    Lactic acid 2.6 (HH) 0.4 - 2.0 MMOL/L   URINALYSIS W/MICROSCOPIC    Collection Time: 09/25/22  6:28 PM   Result Value Ref Range    Color YELLOW/STRAW      Appearance HAZY (A) CLEAR      Specific gravity 1.015 1.003 - 1.030      pH (UA) 6.0 5.0 - 8.0      Protein 30 (A) NEG mg/dL    Glucose 100 (A) NEG mg/dL    Ketone 15 (A) NEG mg/dL    Bilirubin Negative NEG      Blood TRACE (A) NEG      Urobilinogen 0.2 0.2 - 1.0 EU/dL    Nitrites Negative NEG      Leukocyte Esterase Negative NEG      WBC 0-4 0 - 4 /hpf    RBC 0-5 0 - 5 /hpf    Epithelial cells FEW FEW /lpf    Bacteria Negative NEG /hpf    CA Oxalate crystals 4+ (A) NEG   URINE CULTURE HOLD SAMPLE    Collection Time: 09/25/22  6:28 PM    Specimen: Serum; Urine   Result Value Ref Range    Urine culture hold        Urine on hold in Microbiology dept for 2 days. If unpreserved urine is submitted, it cannot be used for addtional testing after 24 hours, recollection will be required. POC LACTIC ACID    Collection Time: 09/25/22  9:48 PM   Result Value Ref Range    Lactic Acid (POC) 0.87 0.40 - 2.00 mmol/L         Imaging:     CT HEAD WO CONT    Result Date: 9/25/2022  No acute intracranial abnormality. CT ABD PELV WO CONT    Result Date: 9/25/2022  1. Left lower lobe consolidation may represent lobar pneumonia. 2.  Overall chronic findings in the bilateral dependent lower lungs. Chronic aspiration can be considered. 3.  No acute abdominal or pelvic findings. 4.  Incidental findings as above. XR CHEST PORT    Result Date: 9/25/2022  No acute cardiopulmonary process      Admit to inpatient status      Patient was explained about the risk of admission including and not a complete list including risk of falls,fractures,blood clots,allergic reactions,infections. Patient/family also understands and agrees to the treatment plan including medications and side effect profiles and also understand the risk with radiation while undergoing imaging studies. The patient and the family/friends (after permission given by the patient to discuss) understand this and agree with the admission plan.         Assessment:     Active Problems:    Sepsis ([address] (9/25/2022)           Plan:     Nausea/ vomiting -   Supportive care  IVF Admit to medical  WBC elevated mild elevation from n/v  Do not agree with sepsis vitals stable    DEENA  -- cont IVF repeat lab    Dementia/ metabolic encephalopathy ? -- unclear etiology     REQUESTED MED REC  PT/OT eval , SLP eval and CM consulted to touch base with son    Code: DNR  DVT SCD  Called son updated clinical situation patient's son is a medical power of        Signed By: Betty Chris MD     September 26, 2022

## 2022-09-26 NOTE — ED NOTES
Emergency Room Nursing Note        Patient Name: Roberto Carlos Mclean      : 10/4/1928             MRN: [id number]      Chief Complaint:  Vomiting      Admit Diagnosis: Sepsis [facility]) [A41.9]      Admitting Provider: Ada You MD      Surgery: * No surgery found *           Patient's Son was informed of patient transfer as requested.          Lines:   Peripheral IV 22 Right Antecubital (Active)   Site Assessment Clean, dry, & intact 22 1608   Phlebitis Assessment 0 22 1608   Infiltration Assessment 0 22 1608   Dressing Status Clean, dry, & intact 22 1608   Dressing Type Transparent 22 1608   Hub Color/Line Status Flushed;Pink 22 1608       Peripheral IV 22 Left Forearm (Active)   Site Assessment Clean, dry, & intact 22 1713   Phlebitis Assessment 0 22 1713   Infiltration Assessment 0 22 1713   Dressing Status Clean, dry, & intact 22 1713   Dressing Type Tape;Transparent 22 1713   Hub Color/Line Status Pink;Flushed 22 1713   Action Taken Blood drawn 22 1713       Peripheral IV 22 Right Forearm (Active)   Site Assessment Clean, dry, & intact 22 1714   Phlebitis Assessment 0 22 1714   Infiltration Assessment 0 22 1714   Dressing Status Clean, dry, & intact 22 1714   Dressing Type Transparent;Tape 22 171   Hub Color/Line Status Pink;Flushed 22 171   Action Taken Blood drawn 22         Signed by: Lisa Sullivan RN, ADIA, BSN, CMSRN                                              2022 at 3:57 AM

## 2022-09-26 NOTE — PROGRESS NOTES
RUR 10 %     Transition of Care- Home with possible home health, TBD- PT and OT evaluations pending- patient is independent using no DME at baseline per the son. The son may purchase a bedside commode if the patient returns home at discharge. Son will transport home when medically stable. Follow up with PCP. - patient had an appointment scheduled for the 3rd or 4th of Oct- per the son. CM perfect served the attending with the son's # - he is requesting a medical update. No history of Home Health or SNF in the past. The son would be open for discussion if indicated. Decision Maker/son- Marilee Code. [phone]. Medicare pt has received, reviewed, and signed [facility] letter informing them of their right to appeal the discharge. Signed copied has been placed on pt bedside chart. Reason for Admission:  Vomiting                      RUR Score:     10 %                Plan for utilizing home health:    TBD    Transition of Care Plan:     The Plan for Transition of Care is related to the following treatment goals: home health for SNF- no preference at this time    The Patient and/or patient representative  was provided with a choice of provider and agrees  with the discharge plan. Yes [x] No []    A Freedom of choice list was provided with basic dialogue that supports the patient's individualized plan of care/goals and shares the quality data associated with the providers.        Yes [x] No []        PCP: First and Last name:  Kayla Bland MD     Name of Practice:    Are you a current patient: Yes/No:    Approximate date of last visit: 1 year ago- patient has PCP appointment for Oct. 3rd for 4th   Can you participate in a virtual visit with your PCP:                     Current Advanced Directive/Advance Care Plan: Full Code      Healthcare Decision Maker:   Click here to complete [address] including selection of the Healthcare Decision Maker Relationship (ie \"Primary\")             Primary Decision Maker: Sav Feliz - [phone]                  Transition of Care Plan:    PT/OT/SLP- Medical plan pending. Hoping for return home- independent at baseline using no DME- son may purchase a Bed Side Commode. The patient's bathroom is on the 2nd floor. No history of home health or SNF. CM spoke with the son by phone and met with the patient. The patient lives alone in a 2 story town home- bathroom and bedroom up  upstairs. The patient was independent using no DME at baseline. The son may plan to purchase at bedside commode for the first floor. The son is open to therapy, CM, and physician recommendations. CM confirmed demographics, PCP, and Insurance    CM will follow for transition of care. Care Management Interventions  PCP Verified by CM:  Yes  Palliative Care Criteria Met (RRAT>21 & CHF Dx)?: No  Mode of Transport at Discharge: Self (son transport by car)  Transition of Care Consult (CM Consult): Discharge Planning  MyChart Signup: No  Discharge Durable Medical Equipment: No (son may purchase BSC or If insurance covers and the plan is home we could try to order here. )  Health Maintenance Reviewed: Yes  Physical Therapy Consult: Yes  Occupational Therapy Consult: Yes  Speech Therapy Consult: Yes  Support Systems: Child(merritt) (son)  Confirm Follow Up Transport: Family  The Plan for Transition of Care is Related to the Following Treatment Goals : PT- OT- SLP- discharge planning- son hopes for patient to return home- independent at [place] using no DME  The Patient and/or Patient Representative was Provided with a Choice of Provider and Agrees with the Discharge Plan?: Yes  Name of the Patient Representative Who was Provided with a Choice of Provider and Agrees with the Discharge Plan: The son and the patient  Freedom of Choice List was Provided with Basic Dialogue that Supports the Patient's Individualized Plan of Care/Goals, Treatment Preferences and Shares the Quality Data Associated with the Providers?: Yes  The [employer] Information Provided?: No  Discharge Location  Patient Expects to be Discharged to[de-identified] Home with home health      ZAC Loya

## 2022-09-26 NOTE — ACP (ADVANCE CARE PLANNING)
Advance Care Planning (ACP) Physician/NP/PA Conversation      Date of Conversation: 9/25/2022  Conducted with: Patient with Decision Making Capacity and Healthcare Decision Maker: Named in Advance Directive or [address]:     Primary Decision Maker: Dionne Butler - [phone]  Click here to complete [address] including selection of the Healthcare Decision Maker Relationship (ie \"Primary\")    Today we documented Decision Maker(s) consistent with Legal Next of Kin hierarchy. Care Preferences:    Hospitalization: \"If your health worsens and it becomes clear that your chance of recovery is unlikely, what would be your preference regarding hospitalization? \"  The patient would prefer hospitalization. Ventilation: \"If you were unable to breathe on your own and your chance of recovery was unlikely, what would be your preference about the use of a ventilator (breathing machine) if it was available to you? \"   The patient would NOT desire the use of a ventilator. Resuscitation: \"In the event your heart stopped as a result of an underlying serious health condition, would you want attempts to be made to restart your heart, or would you prefer a natural death? \"   No, do NOT attempt to resuscitate.     Additional topics discussed: treatment goals, benefit/burden of treatment options, artificial nutrition, ventilation preferences, hospitalization preferences, and resuscitation preferences    Conversation Outcomes / Follow-Up Plan:   ACP complete - no further action today  Reviewed DNR/DNI and patient confirms current DNR status - completed forms on file (place new order if needed)     Length of Voluntary ACP Conversation in minutes:  20 minutes    Milan Ignacio MD

## 2022-09-26 NOTE — PROGRESS NOTES
Problem: Self Care Deficits Care Plan (Adult)  Goal: *Acute Goals and Plan of Care (Insert Text)  Description: FUNCTIONAL STATUS PRIOR TO ADMISSION: Patient was independent and active without use of DME.     HOME SUPPORT: The patient lived alone with son in area to provide assistance. Occupational Therapy Goals  Initiated 9/26/2022  1. Patient will perform grooming with supervision/set-up within 7 day(s). 2.  Patient will perform lower body dressing with supervision/set-up within 7 day(s). 3.  Patient will perform bathing with supervision/set-up within 7 day(s). 4.  Patient will perform toilet transfers with supervision/set-up within 7 day(s). 5.  Patient will perform all aspects of toileting with supervision/set-up within 7 day(s). 6.  Patient will participate in upper extremity therapeutic exercise/activities with supervision/set-up for 5 minutes within 7 day(s). 7.  Patient will utilize energy conservation techniques during functional activities with verbal cues within 7 day(s). Outcome: Not Met    OCCUPATIONAL THERAPY EVALUATION  Patient: Roland Pereira (71 y.o. male)  Date: 9/26/2022  Primary Diagnosis: Sepsis [facility]) [A41.9]       Precautions:  Fall    ASSESSMENT  Based on the objective data described below, the patient presents with limited ADL performance s/p admission for sepsis. Patient ADLs limited by impaired balance, decreased functional activity tolerance, impaired cognition (attention to task, command following, insight into deficits, safety awareness, etc). Patient lives alone in 2 level [facility] and was IND PTA. Today, patient received in bed and agreeable to therapy. Patient with SBA for bed mobility. Patient required consistent and direct redirection 2/2 impaired insight into deficits and poor safety awareness. Patient min A for sit<>stand and to maintain upright standing balance. Patient required HHA x1 for functional mobility to/from bathroom.  while on way to bathroom, patient incontinent of bladder. Min A for toilet transfer and to change wet gown. Patient required HHAx 1 to return to EOB - refused to attempt to use RW for improved balance and safety. Once sitting EOB, patient SBA to change socks. Patient SBA to return to semisupine and left with call bell in reach, RN aware, all needs met. At this time, patient would benefit from SNF level rehab to maximize safety and functional IND. Will continue to follow. Current Level of Function Impacting Discharge (ADLs/self-care): up to min A for ADLs    Functional Outcome Measure: The patient scored Total: 25/100 on the Barthel Index outcome measure which is indicative of being 75% impairment in basic self-care. Other factors to consider for discharge: lives alone, impaired safety awareness and insight into deficits. Patient will benefit from skilled therapy intervention to address the above noted impairments. PLAN :  Recommendations and Planned Interventions: self care training, functional mobility training, therapeutic exercise, balance training, therapeutic activities, endurance activities, patient education, home safety training, and family training/education    Frequency/Duration: Patient will be followed by occupational therapy 5 times a week to address goals. Recommendation for discharge: (in order for the patient to meet his/her long term goals)  Therapy up to 5 days/week in SNF setting    This discharge recommendation:  Has not yet been discussed the attending provider and/or case management    IF patient discharges home will need the following DME: shower chair       SUBJECTIVE:   Patient stated Keke Arguello you let me know when my son gets here to pick me up and take me home.     OBJECTIVE DATA SUMMARY:   HISTORY:   History reviewed. No pertinent past medical history. History reviewed. No pertinent surgical history.     Expanded or extensive additional review of patient history:     Home Situation  Home Environment: Other (comment) (condo)  # Steps to Enter: 3  Rails to Enter: Yes  One/Two Story Residence: Two story  Living Alone: Yes  Support Systems: Child(merritt) (son)  Patient Expects to be Discharged to[de-identified] Home with home health  Current DME Used/Available at Home: None  Tub or Shower Type: Shower    Hand dominance: Right    EXAMINATION OF PERFORMANCE DEFICITS:  Cognitive/Behavioral Status:  Neurologic State: Alert  Orientation Level: Oriented to person;Oriented to place;Oriented to time;Disoriented to situation  Cognition: Decreased attention/concentration;Decreased command following; Impaired decision making;Poor safety awareness; Impulsive  Perception: Appears intact  Perseveration: No perseveration noted  Safety/Judgement: Decreased awareness of environment;Decreased awareness of need for assistance;Decreased awareness of need for safety;Decreased insight into deficits; Fall prevention    Skin: appears grossly intact    Edema: none noted in BUEs    Hearing: Auditory  Auditory Impairment: None    Vision/Perceptual:    Tracking: Able to track stimulus in all quadrants w/o difficulty    Diplopia: No    Acuity: Within Defined Limits    Corrective Lenses: Reading glasses    Range of Motion:  In BUEs  AROM: Generally decreased, functional    Strength: In BUEs  Strength: Generally decreased, functional    Coordination:  Coordination: Generally decreased, functional  Fine Motor Skills-Upper: Left Intact; Right Intact    Gross Motor Skills-Upper: Left Intact; Right Intact    Tone & Sensation:  In BUEs  Tone: Normal  Sensation: Intact    Balance:  Sitting: Intact  Standing: Impaired; With support  Standing - Static: Fair  Standing - Dynamic : Fair;Poor    Functional Mobility and Transfers for ADLs:  Bed Mobility:  Supine to Sit: Stand-by assistance  Sit to Supine: Stand-by assistance    Transfers:  Sit to Stand: Contact guard assistance  Stand to Sit: Contact guard assistance  Toilet Transfer : Minimum assistance  Assistive Device : Gait Belt    ADL Assessment:  Feeding: Setup    Oral Facial Hygiene/Grooming: Contact guard assistance    Bathing: Minimum assistance    Upper Body Dressing: Minimum assistance    Lower Body Dressing: Minimum assistance    Toileting: Minimum assistance    ADL Intervention and task modifications:    Upper Body [address]: Minimum  assistance    Lower Body Dressing Assistance  Socks: Stand-by assistance  Leg Crossed Method Used: Yes  Position Performed: Seated edge of bed  Cues: Don;Doff    Toileting  Bladder Hygiene: Minimum assistance  Bowel Hygiene: Minimum assistance  Clothing Management: Minimum assistance  Adaptive Equipment: Grab bars    Cognitive Retraining  Safety/Judgement: Decreased awareness of environment;Decreased awareness of need for assistance;Decreased awareness of need for safety;Decreased insight into deficits; Fall prevention    Functional Measure:    Barthel Index:  Bathin  Bladder: 0  Bowels: 5  Groomin  Dressin  Feedin  Mobility: 0  Stairs: 0  Toilet Use: 5  Transfer (Bed to Chair and Back): 10  Total: 25/100      The Barthel ADL Index: Guidelines  1. The index should be used as a record of what a patient does, not as a record of what a patient could do. 2. The main aim is to establish degree of independence from any help, physical or verbal, however minor and for whatever reason. 3. The need for supervision renders the patient not independent. 4. A patient's performance should be established using the best available evidence. Asking the patient, friends/relatives and nurses are the usual sources, but direct observation and common sense are also important. However direct testing is not needed. 5. Usually the patient's performance over the preceding 24-48 hours is important, but occasionally longer periods will be relevant. 6. Middle categories imply that the patient supplies over 50 per cent of the effort.   7. Use of aids to be independent is allowed. Score Interpretation (from [facility] 83)    Independent   60-79 Minimally independent   40-59 Partially dependent   20-39 Very dependent   <20 Totally dependent     -Angi Lemus., Barthel, D.W. (1965). Functional evaluation: the Barthel Index. [address] ([address] (1997). The Barthel activities of daily living index: self-reporting versus actual performance in the old (> or = 75 years). Journal [address] 45(7), 14 [facility], J.J.M.F, Eddie Sims., Jonas Hernandez. (1999). Measuring the change in disability after inpatient rehabilitation; comparison of the responsiveness of the Barthel Index and Functional [place] Measure. Journal of Neurology, Neurosurgery, and Psychiatry, 66(4), 177-559. AMBAR Gutierrez, PEGGY Sequeira, & Basia Espinosa MReggieA. (2004) Assessment of post-stroke quality of life in cost-effectiveness studies: The usefulness of the Barthel Index and the EuroQoL-5D. Quality of Life Research, 15, 999-37     Occupational Therapy Evaluation Charge Determination   History Examination Decision-Making   LOW Complexity : Brief history review  LOW Complexity : 1-3 performance deficits relating to physical, cognitive , or psychosocial skils that result in activity limitations and / or participation restrictions  MEDIUM Complexity : Patient may present with comorbidities that affect occupational performnce.  Miniml to moderate modification of tasks or assistance (eg, physical or verbal ) with assesment(s) is necessary to enable patient to complete evaluation       Based on the above components, the patient evaluation is determined to be of the following complexity level: LOW   Pain Rating:  Reporting no pain    Activity Tolerance:   Fair    After treatment patient left in no apparent distress:    Supine in bed, Call bell within reach, Bed / chair alarm activated, Side rails x 3, and RN aware    COMMUNICATION/EDUCATION:   The patients plan of care was discussed with: Physical therapist and Registered nurse. Home safety education was provided and the patient/caregiver indicated understanding. and Patient/family have participated as able in goal setting and plan of care. This patients plan of care is appropriate for delegation to \[facility] [place] [facility]\"".     Thank you for this referral.  Navin Aguayo, OT  Time Calculation: 25 mins

## 2022-09-26 NOTE — ROUTINE PROCESS
Emergency Room Outgoing Transfer Nursing Note      Verbal and/or Written report given to [facility] AND [facility] - THE [facility], RN by Rob Adames RN on Reinaldo Rouse a 80 y.o. male who was admitted on 9/25/2022  3:51 PM and being transferred for routine progression of care. Report consisted of the following information SBAR, Kardex, MAR, and Recent Results and the information was reviewed with the receiving nurse. Code Status: Full Code      Chief Complaint: Vomiting      Admit Diagnosis: Sepsis ([address] [A41.9]      Admitting Provider: Monique Castellanos MD      Surgery: * No surgery found *       Infections: No current active infections      Allergies: Patient has no known allergies.       Current diet: DIET NPO      Lines:   Peripheral IV 09/25/22 Right Antecubital (Active)   Site Assessment Clean, dry, & intact 09/25/22 1608   Phlebitis Assessment 0 09/25/22 1608   Infiltration Assessment 0 09/25/22 1608   Dressing Status Clean, dry, & intact 09/25/22 1608   Dressing Type Transparent 09/25/22 1608   Hub Color/Line Status Flushed;Pink 09/25/22 1608       Peripheral IV 09/25/22 Left Forearm (Active)   Site Assessment Clean, dry, & intact 09/25/22 1713   Phlebitis Assessment 0 09/25/22 1713   Infiltration Assessment 0 09/25/22 1713   Dressing Status Clean, dry, & intact 09/25/22 1713   Dressing Type Tape;Transparent 09/25/22 1713   Hub Color/Line Status Pink;Flushed 09/25/22 1713   Action Taken Blood drawn 09/25/22 1713       Peripheral IV 09/25/22 Right Forearm (Active)   Site Assessment Clean, dry, & intact 09/25/22 1714   Phlebitis Assessment 0 09/25/22 1714   Infiltration Assessment 0 09/25/22 1714   Dressing Status Clean, dry, & intact 09/25/22 1714   Dressing Type Transparent;Tape 09/25/22 1714   Hub Color/Line Status Pink;Flushed 09/25/22 1714   Action Taken Blood drawn 09/25/22 1714                Vital Signs:   Patient Vitals for the past 12 hrs:   Temp Pulse Resp BP SpO2   09/26/22 0230 -- 83 -- 106/79 96 %   09/26/22 0215 -- 78 25 93/70 96 %   09/26/22 0145 -- 83 20 (!) 100/58 96 %   09/26/22 0100 -- 82 25 101/62 96 %   09/26/22 0045 -- 88 21 101/67 96 %   09/26/22 0030 -- 89 -- 122/73 96 %   09/26/22 0015 -- 96 23 104/83 96 %   09/26/22 0000 -- 89 23 103/66 95 %   09/25/22 2315 -- (!) 103 24 117/84 97 %   09/25/22 2245 -- 87 26 (!) 107/54 94 %   09/25/22 2230 -- 84 21 95/68 98 %   09/25/22 2200 -- 96 27 104/69 96 %   09/25/22 2145 -- 92 26 103/64 94 %   09/25/22 2130 -- 88 25 100/67 95 %   09/25/22 2115 -- 85 27 98/63 92 %   09/25/22 2100 -- 97 25 98/67 94 %   09/25/22 2045 -- 99 23 111/68 95 %   09/25/22 2000 -- 97 26 111/60 96 %   09/25/22 1938 -- 98 26 116/73 95 %   09/25/22 1930 -- 95 25 112/66 95 %   09/25/22 1923 -- 91 24 110/68 96 %   09/25/22 1823 -- (!) 115 22 110/71 --   09/25/22 1808 -- 100 -- 112/61 92 %   09/25/22 1753 -- 95 22 113/62 --   09/25/22 1740 -- 97 22 114/61 93 %   09/25/22 1710 -- 100 24 136/79 --   09/25/22 1655 -- 100 29 130/75 --   09/25/22 1625 -- 93 29 (!) 143/77 98 %   09/25/22 1610 -- 90 28 111/67 92 %   09/25/22 1605 98.5 °F (36.9 °C) 88 16 113/69 100 %           Intake & Output:     Intake/Output Summary (Last 24 hours) at 9/26/2022 0347  Last data filed at 9/25/2022 2035  Gross per 24 hour   Intake 2250 ml   Output --   Net 2250 ml          Laboratory Results:     Recent Results (from the past 12 hour(s))   CBC WITH AUTOMATED DIFF    Collection Time: 09/25/22  4:04 PM   Result Value Ref Range    WBC 11.2 (H) 4.1 - 11.1 K/uL    RBC 4.75 4.10 - 5.70 M/uL    HGB 14.4 12.1 - 17.0 g/dL    HCT 44.2 36.6 - 50.3 %    MCV 93.1 80.0 - 99.0 FL    MCH 30.3 26.0 - 34.0 PG    MCHC 32.6 30.0 - 36.5 g/dL    RDW 12.9 11.5 - 14.5 %    PLATELET 362 312 - 797 K/uL    MPV 10.1 8.9 - 12.9 FL    NRBC 0.0 0  WBC    ABSOLUTE NRBC 0.00 0.00 - 0.01 K/uL    NEUTROPHILS 85 (H) 32 - 75 %    LYMPHOCYTES 9 (L) 12 - 49 %    MONOCYTES 6 5 - 13 %    EOSINOPHILS 0 0 - 7 %    BASOPHILS 0 0 - 1 % IMMATURE GRANULOCYTES 0 0.0 - 0.5 %    ABS. NEUTROPHILS 9.5 (H) 1.8 - 8.0 K/UL    ABS. LYMPHOCYTES 1.0 0.8 - 3.5 K/UL    ABS. MONOCYTES 0.7 0.0 - 1.0 K/UL    ABS. EOSINOPHILS 0.0 0.0 - 0.4 K/UL    ABS. BASOPHILS 0.0 0.0 - 0.1 K/UL    ABS. IMM. GRANS. 0.0 0.00 - 0.04 K/UL    DF AUTOMATED     LIPASE    Collection Time: 09/25/22  4:04 PM   Result Value Ref Range    Lipase 173 73 - 541 U/L   METABOLIC PANEL, COMPREHENSIVE    Collection Time: 09/25/22  4:04 PM   Result Value Ref Range    Sodium 138 136 - 145 mmol/L    Potassium 4.3 3.5 - 5.1 mmol/L    Chloride 100 97 - 108 mmol/L    CO2 24 21 - 32 mmol/L    Anion gap 14 5 - 15 mmol/L    Glucose 107 (H) 65 - 100 mg/dL    BUN 25 (H) 6 - 20 MG/DL    Creatinine 1.32 (H) 0.70 - 1.30 MG/DL    BUN/Creatinine ratio 19 12 - 20      GFR est AA >60 >60 ml/min/1.73m2    GFR est non-AA 51 (L) >60 ml/min/1.73m2    Calcium 8.2 (L) 8.5 - 10.1 MG/DL    Bilirubin, total 0.7 0.2 - 1.0 MG/DL    ALT (SGPT) 17 12 - 78 U/L    AST (SGOT) 14 (L) 15 - 37 U/L    Alk.  phosphatase 49 45 - 117 U/L    Protein, total 6.9 6.4 - 8.2 g/dL    Albumin 3.6 3.5 - 5.0 g/dL    Globulin 3.3 2.0 - 4.0 g/dL    A-G Ratio 1.1 1.1 - 2.2     TROPONIN-HIGH SENSITIVITY    Collection Time: 09/25/22  4:30 PM   Result Value Ref Range    Troponin-High Sensitivity 9 0 - 76 ng/L   LACTIC ACID    Collection Time: 09/25/22  5:12 PM   Result Value Ref Range    Lactic acid 2.6 (HH) 0.4 - 2.0 MMOL/L   URINALYSIS W/MICROSCOPIC    Collection Time: 09/25/22  6:28 PM   Result Value Ref Range    Color YELLOW/STRAW      Appearance HAZY (A) CLEAR      Specific gravity 1.015 1.003 - 1.030      pH (UA) 6.0 5.0 - 8.0      Protein 30 (A) NEG mg/dL    Glucose 100 (A) NEG mg/dL    Ketone 15 (A) NEG mg/dL    Bilirubin Negative NEG      Blood TRACE (A) NEG      Urobilinogen 0.2 0.2 - 1.0 EU/dL    Nitrites Negative NEG      Leukocyte Esterase Negative NEG      WBC 0-4 0 - 4 /hpf    RBC 0-5 0 - 5 /hpf    Epithelial cells FEW FEW /lpf Bacteria Negative NEG /hpf    CA Oxalate crystals 4+ (A) NEG   URINE CULTURE HOLD SAMPLE    Collection Time: 09/25/22  6:28 PM    Specimen: Serum; Urine   Result Value Ref Range    Urine culture hold        Urine on hold in Microbiology dept for 2 days. If unpreserved urine is submitted, it cannot be used for addtional testing after 24 hours, recollection will be required. POC LACTIC ACID    Collection Time: 09/25/22  9:48 PM   Result Value Ref Range    Lactic Acid (POC) 0.87 0.40 - 2.00 mmol/L           Patient transported with : [employer]     Opportunity for questions and clarifications were provided.          Cheli Edmondson RN, ADIA, BSN, [facility]     9/26/2022, 3:47 AM

## 2022-09-26 NOTE — PROGRESS NOTES
Bedside and Verbal shift change report given to [place] Farideh Ramos (oncoming nurse) by Phil Doss RN (offgoing nurse). Report included the following information SBAR, Kardex, Intake/Output, and MAR.

## 2022-09-26 NOTE — PROGRESS NOTES
Orders received, chart reviewed and patient evaluated by occupational therapy. Pending progression with skilled acute occupational therapy, recommend:  Therapy up to 5 days/week in SNF setting     Recommend with nursing ADLs with supervision/setup, OOB to chair 3x/day and toileting via beside commode with 1 assist and walker. Thank you for completing as able in order to maintain patient strength, endurance and independence. Full evaluation to follow.

## 2022-09-26 NOTE — PROGRESS NOTES
Problem: Dysphagia (Adult)  Goal: *Acute Goals and Plan of Care (Insert Text)  Description: Speech Therapy Goals  Initiated 9/26/2022  1. Patient will tolerate regular diet, thin liquids without overt s/s aspiration within 7 days. Outcome: Not Met     SPEECH LANGUAGE PATHOLOGY BEDSIDE SWALLOW EVALUATION  Patient: Thu Landry (62 y.o. male)  Date: 9/26/2022  Primary Diagnosis: Sepsis ([facility] [address] [A41.9]       Precautions: Aspiration       ASSESSMENT :  Based on the objective data described below, the patient presents with overall functional oropharyngeal swallow. Patient accepted sips of thin liquids, purees and solids and tolerated without difficulty. Patient with throat clear x 1 while talking and x 1 after single sip of thin liquids. No cough or throat clear noted with subsequent single or successive straw sips of thin liquids. Patient report no nausea and does not recall vomiting recently. Risk of aspiration is elevated given confusion. Further, patient is at greater risk for post prandial aspiration given reported emesis. Given bedside presentation feel patient is safe for regular diet, thin liquids. Patient will benefit from skilled intervention to address the above impairments. Patients rehabilitation potential is considered to be Good     PLAN :  Recommendations and Planned Interventions:  Regular diet  Thin liquids  Sit up for all meals  Medication at tolerated  Frequency/Duration: Patient will be followed by speech-language pathology 2 times a week to address goals. Discharge Recommendations: To Be Determined     SUBJECTIVE:   Patient stated I can't remember. I'll keep thinking about that.  when asked about what happened to him that led to hospitalization. OBJECTIVE:   History reviewed. No pertinent past medical history. History reviewed. No pertinent surgical history.   Prior Level of Function/Home Situation:   Home Situation  Patient Expects to be Discharged to[de-identified] Home  Diet prior to admission: Unknown  Current Diet:  NPO   Cognitive and Communication Status:  Neurologic State: Alert  Orientation Level: Oriented to person, Oriented to place, Disoriented to situation, Disoriented to time  Cognition: Follows commands, Decreased attention/concentration           Oral Assessment:  Oral Assessment  Labial: No impairment  Dentition: Intact  Oral Hygiene: Moist oral mucosa  Lingual: No impairment  Mandible: No impairment  P.O. Trials:  Patient Position: Upright in bed  Vocal quality prior to P.O.: No impairment  Consistency Presented: Thin liquid; Solid;Puree  How Presented: Self-fed/presented;Spoon;Straw;Successive swallows     Bolus Acceptance: No impairment  Bolus Formation/Control: No impairment     Propulsion: No impairment  Oral Residue: None  Initiation of Swallow: No impairment  Laryngeal Elevation: Functional  Aspiration Signs/Symptoms: Clear throat  Pharyngeal Phase Characteristics: No impairment, issues, or problems              Oral Phase Severity: No impairment       NOMS:   The NOMS functional outcome measure was used to quantify this patient's level of swallowing impairment. Based on the NOMS, the patient was determined to be at level 5 for swallow function       NOMS Swallowing Levels:  Level 1 (CN): NPO  Level 2 (CM): NPO but takes consistency in therapy  Level 3 (CL): Takes less than 50% of nutrition p.o. and continues with nonoral feedings; and/or safe with mod cues; and/or max diet restriction  Level 4 (CK): Safe swallow but needs mod cues; and/or mod diet restriction; and/or still requires some nonoral feeding/supplements  Level 5 (CJ): Safe swallow with min diet restriction; and/or needs min cues  Level 6 (CI): Independent with p.o.; rare cues; usually self cues; may need to avoid some foods or needs extra time  Level 7 ([address] [place] [address]): Independent for all p.o.  SHILO. (2003). National Outcomes Measurement System (NOMS): Adult Speech-Language Pathology User's Guide.        After treatment: Patient left in no apparent distress in bed and Call bell within reach    COMMUNICATION/EDUCATION:   Patient was educated regarding role of SLP, diet consistency, swallow precautions and POC. The patient's plan of care including recommendations, planned interventions, and recommended diet changes were discussed with:     Patient/family have participated as able in goal setting and plan of care. Patient/family agree to work toward stated goals and plan of care.     Thank you for this referral.  AMANDA Weller  Time Calculation: 17 mins

## 2022-09-26 NOTE — PROGRESS NOTES
Comprehensive Nutrition Assessment    Type and Reason for Visit: Initial    Nutrition Recommendations/Plan:   Continue regular diet  Ensure enlive chocolate TID, magic cup BID  Please record intakes of meals/supplements in I/Os       Malnutrition Assessment:  Malnutrition Status:  Insufficient data (09/26/22 1506)           Nutrition Assessment:    History reviewed. No pertinent past medical history. 80 y.o. male admitted with n/v, DEENA, possibly sepsis. Pt receiving IVF. Seen by SLP today, recommending regular diet with thin liquids. Visited pt at bedside for low BMI of 17.5. He was unable to give me a UBW, stated he lost 120# 30 years ago when his wife \"went away\". He stated he didn't eat his lunch today because he wasn't hungry. He refused all snacks and was unable to provide food preferences, but he said he likes chocolate ensure. I gave him a vanilla magic cup which he liked- will order both. No wt history in EMR. Pt appears very thin, very little fat or muscle mass but suspect is age related. He denied any n/v/c/d. Labs reviewed. Recent Labs     09/25/22  1604   *   BUN 25*   CREA 1.32*      K 4.3      CO2 24   CA 8.2*         Nutritionally Significant Medications:  Protonix, IV NaCl      Estimated Daily Nutrient Needs:  Energy Requirements Based On: Kcal/kg  Weight Used for Energy Requirements: Current  Energy (kcal/day): 1984  Weight Used for Protein Requirements: Current  Protein (g/day): 93  Method Used for Fluid Requirements: 1 ml/kcal  Fluid (ml/day): 2000    Nutrition Related Findings:   Edema: No data recorded    Last BM: 09/25/22, Formed, Soft    Wounds: Multiple, Pressure injury (L calf, L knee)      Current Nutrition Therapies:  Diet: regular  Supplements: none  Meal intake: No data found. Supplement intake: No data found.   Nutrition Support: none      Anthropometric Measures:  Height: 6' 2\" (188 cm)  Ideal Body Weight (IBW): 190 lbs (86 kg)     Current Body Wt:  62.1 kg (136 lb 14.5 oz), 72.1 % IBW.  Not specified  Current BMI (kg/m2): 17.6        Weight Adjustment: No adjustment  BMI Category: Underweight (BMI less than 18.5)    Wt Readings from Last 10 Encounters:   09/25/22 62.1 kg (136 lb 14.5 oz)           Nutrition Diagnosis:   Underweight related to inadequate protein-energy intake as evidenced by BMI    Nutrition Interventions:   Food and/or Nutrient Delivery: Continue current diet, Snacks (specify), Start oral nutrition supplement  Nutrition Education/Counseling: No recommendations at this time  Coordination of Nutrition Care: Continue to monitor while inpatient       Goals:     Goals: PO intake 50% or greater, by next RD assessment       Nutrition Monitoring and Evaluation:   Behavioral-Environmental Outcomes: None identified  Food/Nutrient Intake Outcomes: Food and nutrient intake, Supplement intake  Physical Signs/Symptoms Outcomes: Biochemical data, Meal time behavior, Weight, Skin, Nausea/vomiting    Discharge Planning:    Continue oral nutrition supplement    Kristin Walter RD  Available via [facility]

## 2022-09-26 NOTE — PROGRESS NOTES
Received the patient from [facility] transport via stretcher approxx at 0535 this am.  Patient was AO x 2 to 3, and he stated he was unaware why he is admitted to [facility].  Patient stated that he did not vomitt and had no nausea. Patient appears to be slightly confused about his placement here, AEB , patient requiring verbalization of why he is admitted. Patient has several skin abnormaities that are present on admission. I documented all on the Anatomical Man for correct location purpose. I placed patient shoes in the closest in the room and pt is aware. I also cleaned patient rear end and sacrum off with CHG bathe, secondary to skin assessment and he had old feces stuck to his rear. Patient states that he needs his suit case to go with his son. Patient had no belongings with him on admission except a pair of brown shorts and grey new balance sneakers. He had on no socks or underware either and no  shirt either.     I reported of to oncoming RN

## 2022-09-27 NOTE — PROGRESS NOTES
Problem: Dysphagia (Adult)  Goal: *Acute Goals and Plan of Care (Insert Text)  Description: Speech Therapy Goals  Initiated 9/26/2022  1. Patient will tolerate regular diet, thin liquids without overt s/s aspiration within 7 days. Outcome: Progressing Towards Goal     SPEECH LANGUAGE PATHOLOGY DYSPHAGIA TREATMENT/DISCHARGE  Patient: Roland Pereira (86 y.o. male)  Date: 9/27/2022  Diagnosis: Sepsis ([facility] [A41.9] <principal problem not specified>      Precautions: Fall, DNR, Bed Alarm    ASSESSMENT:  A swallow treatment was conducted today to assess pt's tolerance of current regular diet with thin liquids. Trials of thin liquids and solids were completed at bedside with no overt difficulty noted. Pt denies any speech or swallowing concerns. Recommend pt continue regular diet with thin liquids. Suspect pt's oropharyngeal swallow function has returned to baseline and therefore no further SLP concerns at this time. SLP will sign off. Please reconsult with any changes or if SLP can be of any further assistance. PLAN:  --Regular diet, thin liquids  --General aspiration precautions  --SLP will sign off     Patient will be discharged from acute skilled speech therapy at this time. Rationale for discharge:  Goals achieved    Discharge Recommendations:  None     SUBJECTIVE:   Patient stated I was supposed to be in [facility].     OBJECTIVE:   Cognitive and Communication Status:  Neurologic State: Alert  Orientation Level: Oriented to person, Oriented to place, Oriented to time, Disoriented to situation  Cognition: Decreased attention/concentration    Perception: Appears intact    Perseveration: No perseveration noted    Safety/Judgement: Decreased awareness of environment, Decreased insight into deficits  Dysphagia Treatment:  Oral Assessment:  Oral Assessment  Labial: No impairment  Dentition: Intact  Oral Hygiene: Oral mucosa moist and free of secretions  Lingual: No impairment  Velum: Unable to visualize  Mandible: No impairment  P.O. Trials:  Patient Position: Upright in bed  Vocal quality prior to P.O.: No impairment  Consistency Presented: Thin liquid; Solid  How Presented: Self-fed/presented     Bolus Acceptance: No impairment  Bolus Formation/Control: No impairment     Propulsion: No impairment  Oral Residue: None  Initiation of Swallow: No impairment  Laryngeal Elevation: Functional  Aspiration Signs/Symptoms: None  Pharyngeal Phase Characteristics: No impairment, issues, or problems      NOMS:   The NOMS functional outcome measure was used to quantify this patient's level of swallowing impairment. Based on the NOMS, the patient was determined to be at level 7 for swallow function     NOMS Swallowing Levels:  Level 1 (CN): NPO  Level 2 (CM): NPO but takes consistency in therapy  Level 3 (CL): Takes less than 50% of nutrition p.o. and continues with nonoral feedings; and/or safe with mod cues; and/or max diet restriction  Level 4 (CK): Safe swallow but needs mod cues; and/or mod diet restriction; and/or still requires some nonoral feeding/supplements  Level 5 (CJ): Safe swallow with min diet restriction; and/or needs min cues  Level 6 (CI): Independent with p.o.; rare cues; usually self cues; may need to avoid some foods or needs extra time  Level 7 ([address] [place] [address]): Independent for all p.o.  SHILO. (2003). National Outcomes Measurement System (NOMS): Adult Speech-Language Pathology User's Guide. After treatment:   Patient left in no apparent distress in bed, Call bell within reach, Nursing notified, and Caregiver / family present    COMMUNICATION/EDUCATION:   The patient's plan of care including recommendations, planned interventions, and recommended diet changes were discussed with: Registered nurseReggie Santo  Time Calculation: 15 mins    Was present with student for the entirety of session in a supervisory capacity. Agree with assessment and recommendations as written above.     Stanford Husain, [address], [address] Pathologist

## 2022-09-27 NOTE — PROGRESS NOTES
Problem: Self Care Deficits Care Plan (Adult)  Goal: *Acute Goals and Plan of Care (Insert Text)  Description: FUNCTIONAL STATUS PRIOR TO ADMISSION: Patient was independent and active without use of DME.     HOME SUPPORT: The patient lived alone with son in area to provide assistance. Occupational Therapy Goals  Initiated 9/26/2022  1. Patient will perform grooming with supervision/set-up within 7 day(s). 2.  Patient will perform lower body dressing with supervision/set-up within 7 day(s). 3.  Patient will perform bathing with supervision/set-up within 7 day(s). 4.  Patient will perform toilet transfers with supervision/set-up within 7 day(s). 5.  Patient will perform all aspects of toileting with supervision/set-up within 7 day(s). 6.  Patient will participate in upper extremity therapeutic exercise/activities with supervision/set-up for 5 minutes within 7 day(s). 7.  Patient will utilize energy conservation techniques during functional activities with verbal cues within 7 day(s). Outcome: Not Progressing Towards Goal   OCCUPATIONAL THERAPY TREATMENT  Patient: Chris Tobin (10 y.o. male)  Date: 9/27/2022  Diagnosis: Sepsis ([facility] [address] [A41.9] <principal problem not specified>      Precautions: Fall, DNR, Bed Alarm  Chart, occupational therapy assessment, plan of care, and goals were reviewed. ASSESSMENT  Patient continues with skilled OT services and is not progressing towards goals. Pt received supine in bed, alert, however, only oriented to self. Pt perseverating in conversation over locating reading glasses, tall stack of magazines and being in [place]. Attempted to reoriented pt multiple times, including use of wall sign for orientation of place; pt continued to perseverate and unable to reorient. Pt requesting to call son; pt unable to recall number or accurately utilize phone without [address]. Pt again perseverating on telephone with son and very confused.  Left pt in bed on phone with call bell in reach and bed alarm on. Discharge recommendations pending progress, at this time, suspect SNF. Current Level of Function Impacting Discharge (ADLs): supervision/set-up to modA    Other factors to consider for discharge: cognition, disorientation         PLAN :  Patient continues to benefit from skilled intervention to address the above impairments. Continue treatment per established plan of care to address goals. Recommend with staff: Han Guerrero with assistance, encourage UB ADLs    Recommend next OT session: ADLs, OOB transfers    Recommendation for discharge: (in order for the patient to meet his/her long term goals)  Therapy up to 5 days/week in SNF setting    This discharge recommendation:  Has not yet been discussed the attending provider and/or case management    IF patient discharges home will need the following DME: TBD       SUBJECTIVE:   Patient stated Alric Broccoli are my glasses? I'm in [place].     OBJECTIVE DATA SUMMARY:   Cognitive/Behavioral Status:  Neurologic State: Alert;Confused  Orientation Level: Oriented to person;Disoriented to place; Disoriented to situation;Disoriented to time  Cognition: Impaired decision making;Memory loss;Poor safety awareness  Perception: Appears intact  Perseveration: Perseverates during conversation  Safety/Judgement: Decreased awareness of environment;Decreased awareness of need for assistance;Decreased awareness of need for safety;Decreased insight into deficits    Functional Mobility and Transfers for ADLs:  Bed Mobility:   NT pt constantly perseverating        ADL Intervention:       Cognitive Retraining  Orientation Retraining: Reorienting;Situation;Time;Place  Problem Solving: Awareness of environment  Executive Functions: Regulating behavior  Organizing/Sequencing: Breaking task down  Attention to Task: Single task;Distractibility  Safety/Judgement: Decreased awareness of environment;Decreased awareness of need for assistance;Decreased awareness of need for safety;Decreased insight into deficits    Pain:  No c/o pain    Activity Tolerance:   Poor    After treatment patient left in no apparent distress:   Supine in bed, Call bell within reach, Bed / chair alarm activated, and Side rails x 3    COMMUNICATION/COLLABORATION:   The patients plan of care was discussed with: Physical therapist and Registered nurse.      Celestina Pelaez OT  Time Calculation: 16 mins

## 2022-09-27 NOTE — PROGRESS NOTES
Problem: Falls - Risk of  Goal: *Absence of Falls  Description: Document Elizabeth Roach Fall Risk and appropriate interventions in the flowsheet.   Outcome: Progressing Towards Goal  Note: Fall Risk Interventions:  Mobility Interventions: Bed/chair exit alarm    Mentation Interventions: Bed/chair exit alarm         Elimination Interventions: Bed/chair exit alarm
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
## 2022-09-27 NOTE — PROGRESS NOTES
[facility]  Hospitalist Group                                                                                          Hospitalist Progress Note  John Paul Brown MD  Answering service: [phone] from in house phone        Date of Service:  2022  NAME:  Roland Pereira  :  10/4/1928  MRN:  [id number]      Admission Summary:   Roland Pereira is a 80 y.o. male who presents with vomiting as per ED chart. Pt is a poor historian he didn't complain of anything . Pt seems to be demented . Called son left , labs reviewed, sepsis was documented by ED but nothing else was done for sepsis. Pt does not appear to be septic as well. Labs and imaging does not seems to have a septic picture. Interval history / Subjective:   Patient seen and examined chart reviewed discussed in length with son yesterday  WBC trending up not sure the source patient does not have any fever currently treating as if patient has aspiration     Assessment & Plan:     Metabolic encephalopathy possibly dementia versus sepsis? -- No source was found neuro does not have any fever white count trending up  --CT scan showing possible atelectasis versus aspiration will start cefepime follow cultures blood culture no growth to date urinalysis also has no WBC or possible  --Follow clinically    DEENA  --Possibly obstructive uropathy  --Leave catheter in transition if unable to void and >250 mL and bladder    Nausea/ vomiting -   Supportive care      Code status: DNR  DVT prophylaxis: SCD    Care Plan discussed with: Patient/Family and Nurse  Anticipated Disposition: Home w/Family  Anticipated Discharge: 24 hours to 48 hours     Hospital Problems  Never Reviewed            Codes Class Noted POA    Sepsis ([facility] [address] ICD-10-CM: A41.9  ICD-9-CM: 038.9, 995.91  2022 Unknown             Review of Systems:   A comprehensive review of systems was negative. Vital Signs:    Last 24hrs VS reviewed since prior progress note. Most recent are:  Visit Vitals  BP (!) 108/53 (BP 1 Location: Left upper arm, BP Patient Position: At rest)   Pulse 66   Temp 97.8 °F (36.6 °C)   Resp 17   Ht 6' 2\" (1.88 m)   Wt 62.1 kg (136 lb 14.5 oz)   SpO2 97%   BMI 17.58 kg/m²       No intake or output data in the 24 hours ending 09/27/22 1452     Physical Examination:     I had a face to face encounter with this patient and independently examined them on 9/27/2022 as outlined below:          General : alert x 3, awake, no acute distress, resting in bed, pleasant   HEENT: PEERL, EOMI, moist mucus membrane, TM clear  Neck: supple, no JVD, no meningeal signs  Chest: Clear to auscultation bilaterally   CVS: S1 S2 heard, Capillary refill less than 2 seconds  Abd: soft/ Non tender, non distended, BS physiological,   Ext: no clubbing, no cyanosis, no edema, brisk 2+ DP pulses  Neuro/Psych: pleasant mood and affect, CN 2-12 grossly intact  Skin: warm     Data Review:    I personally reviewed  Image and labs      Labs:     Recent Labs     09/27/22  0522 09/25/22  1604   WBC 12.9* 11.2*   HGB 13.1 14.4   HCT 40.3 44.2    282     Recent Labs     09/27/22  0522 09/25/22  1604    138   K 4.5 4.3    100   CO2 22 24   BUN 59* 25*   CREA 3.76* 1.32*   GLU 79 107*   CA 7.9* 8.2*     Recent Labs     09/25/22  1604   ALT 17   AP 49   TBILI 0.7   TP 6.9   ALB 3.6   GLOB 3.3   LPSE 173     No results for input(s): INR, PTP, APTT, INREXT in the last 72 hours. No results for input(s): FE, TIBC, PSAT, FERR in the last 72 hours. No results found for: FOL, RBCF   No results for input(s): PH, PCO2, PO2 in the last 72 hours. No results for input(s): CPK, CKNDX, TROIQ in the last 72 hours.     No lab exists for component: CPKMB  No results found for: CHOL, CHOLX, CHLST, CHOLV, HDL, HDLP, LDL, LDLC, DLDLP, TGLX, TRIGL, TRIGP, CHHD, CHHDX  No results found for: [facility]  Lab Results   Component Value Date/Time    Color YELLOW/STRAW 09/25/2022 06:28 PM    Appearance HAZY (A) 09/25/2022 06:28 PM    Specific gravity 1.015 09/25/2022 06:28 PM    pH (UA) 6.0 09/25/2022 06:28 PM    Protein 30 (A) 09/25/2022 06:28 PM    Glucose 100 (A) 09/25/2022 06:28 PM    Ketone 15 (A) 09/25/2022 06:28 PM    Bilirubin Negative 09/25/2022 06:28 PM    Urobilinogen 0.2 09/25/2022 06:28 PM    Nitrites Negative 09/25/2022 06:28 PM    Leukocyte Esterase Negative 09/25/2022 06:28 PM    Epithelial cells FEW 09/25/2022 06:28 PM    Bacteria Negative 09/25/2022 06:28 PM    WBC 0-4 09/25/2022 06:28 PM    RBC 0-5 09/25/2022 06:28 PM         Medications Reviewed:     Current Facility-Administered Medications   Medication Dose Route Frequency    cefepime (MAXIPIME) 1 g in 0.9% sodium chloride (MBP/ADV) 50 mL MBP  1 g IntraVENous Q12H    pantoprazole (PROTONIX) 40 mg in 0.9% sodium chloride 10 mL injection  40 mg IntraVENous DAILY    sodium chloride (NS) flush 5-40 mL  5-40 mL IntraVENous Q8H    sodium chloride (NS) flush 5-40 mL  5-40 mL IntraVENous PRN    acetaminophen (TYLENOL) tablet 650 mg  650 mg Oral Q6H PRN    Or    acetaminophen (TYLENOL) suppository 650 mg  650 mg Rectal Q6H PRN    polyethylene glycol (MIRALAX) packet 17 g  17 g Oral DAILY PRN    ondansetron (ZOFRAN ODT) tablet 4 mg  4 mg Oral Q8H PRN    Or    ondansetron (ZOFRAN) injection 4 mg  4 mg IntraVENous Q6H PRN     ______________________________________________________________________  EXPECTED LENGTH OF STAY: 2d 14h  ACTUAL LENGTH OF STAY:          2      Please note that this dictation was completed with locr, the [employer] voice recognition software. Quite often unanticipated grammatical, syntax, homophones, and other interpretive errors are inadvertently transcribed by the computer software. Please disregard these errors. Please excuse any errors that have escaped final proofreading.                 Maria C Landrum MD

## 2022-09-27 NOTE — PROGRESS NOTES
Physical Therapy  9/27/2022    Chart reviewed. Attempted to see patient x2. Upon first attempt, patient perseverating on need for glasses and books that were not present in hospital room during OT session and unable to redirected. Attempted this afternoon and he politely but adamantly refused OOB despite education that he told this PT yesterday to come back today. Remains confused. Unable to encourage mobility despite education and offer to mobilize to bathroom. Follow up tomorrow. Thank you. Continue to recommend SNF.      Rachel Wu, PT, DPT

## 2022-09-27 NOTE — PROGRESS NOTES
Transition of Care Plan  RUR- 13% Moderate Risk  DISPOSITION: The disposition plan is to transition to a SNF -  to provide choice. F/U with PCP/Specialist    Transport: [facility]- [phone], scheduled for     Patient has been recommended for SNF. The Plan for Transition of Care is related to the following treatment goals: SNF    The Patient and/or patient representative (son) was provided with a choice of provider and agrees   with the discharge plan. [x] Yes [] No    Freedom of choice list was provided with basic dialogue that supports the patient's individualized plan of care/goals, treatment preferences and shares the quality data associated with the providers. [x] Yes [] No    CM to provide choice and submit for review. At 4:07pm - CM contacted patients son to provide update regarding the discharge plan. Patients son requested to speak with patients assigned nurse. CM provided phone to patients assigned nurse. Patients son reports that patient has not been to a rehab before. Patients son requested CM to submit SNF list via email. At 4:25pm - CM submitted SNF list to patients son via email.     Samaria Miller, SHAHAB, CRM, LMHP-e  Available in [employer]

## 2022-09-27 NOTE — PROGRESS NOTES
Pharmacy Note     Cefepime 1 gm IV q8h ordered for treatment of aspiration pneumonia. Per [facility] Renal / Extended Infusion B Lactam Policy, Cefepime will be changed to 2 gm IV once then 1 gm IV q12h. Estimated Creatinine Clearance: Estimated Creatinine Clearance: 10.8 mL/min (A) (based on SCr of 3.76 mg/dL (H)). Dialysis Status, DEENA, CKD: DEENA    BMI:  Body mass index is 17.58 kg/m². Recent Labs     22  0522 22  1604   WBC 12.9* 11.2*     Temp (24hrs), Av.9 °F (36.6 °C), Min:97.6 °F (36.4 °C), Max:98.3 °F (36.8 °C)      Pharmacy will continue to monitor and adjust dose as necessary. Please call with any questions.     Thank you,  Liset Boggs, PHARMD

## 2022-09-27 NOTE — PROGRESS NOTES
Performed bladder check on patient. Scan showed only 132 mL in the bladder at that time of scan. Will continue to perform routine bladder checks and monitor patient's output.

## 2022-09-27 NOTE — PROGRESS NOTES
Bedside and Verbal shift change report given to [address] [place] [address] Richard (oncoming nurse) by Sekou Wilson RN (offgoing nurse). Report included the following information SBAR, Kardex, Intake/Output, and MAR.

## 2022-09-27 NOTE — CONSULTS
Consult received   Will see him later this afternoon       [facility]  Office :[phone]  Fax: [phone]

## 2022-09-28 NOTE — PROGRESS NOTES
Transition of Care Plan  RUR- 13% Moderate Risk  DISPOSITION: The disposition plan is to transition to a SNF - pending review. F/U with PCP/Specialist    Transport: [facility] - [phone], scheduled for      Patient has been recommended for SNF. CM emailed SNF list to son per request yesterday. Son reports and confirms that he will provide 3-5 choices. Once choices are received, CM to submit for review. At 11:44am - CM returned missed call of son to follow up regarding choice. CM left VM and awaiting callback. At 12:00pm - Patients son returned Cms missed call and inquired about short term care at a SNF vs long term care. Patients son reports that he will provide choices later on today. At 12:30pm - CM received SNF choices from son, provided below:  [facility] - [phone] - pending review. (ALL SCRIP)  [facility] [phone] - pending review. [address]  [phone] - pending review.     Bernard Martinez, MSW, CRM, LMHP-e  Available in [employer]

## 2022-09-28 NOTE — PROGRESS NOTES
Problem: Self Care Deficits Care Plan (Adult)  Goal: *Acute Goals and Plan of Care (Insert Text)  Description: FUNCTIONAL STATUS PRIOR TO ADMISSION: Patient was independent and active without use of DME.     HOME SUPPORT: The patient lived alone with son in area to provide assistance. Occupational Therapy Goals  Initiated 9/26/2022  1. Patient will perform grooming with supervision/set-up within 7 day(s). 2.  Patient will perform lower body dressing with supervision/set-up within 7 day(s). 3.  Patient will perform bathing with supervision/set-up within 7 day(s). 4.  Patient will perform toilet transfers with supervision/set-up within 7 day(s). 5.  Patient will perform all aspects of toileting with supervision/set-up within 7 day(s). 6.  Patient will participate in upper extremity therapeutic exercise/activities with supervision/set-up for 5 minutes within 7 day(s). 7.  Patient will utilize energy conservation techniques during functional activities with verbal cues within 7 day(s). Outcome: Progressing Towards Goal   OCCUPATIONAL THERAPY TREATMENT  Patient: Roberto Carlos Mclean (32 y.o. male)  Date: 9/28/2022  Diagnosis: [facility]epsis ([facility] [A41.9] <principal problem not specified>      Precautions: Fall, DNR, Bed Alarm  Chart, occupational therapy assessment, plan of care, and goals were reviewed. ASSESSMENT  Patient continues with skilled OT services and is progressing towards goals. Pt with slightly less confusion today, comparative to yesterday OT session, however, continues to be oriented to self and place with heavy cues for location. Pt also noted with occasional expressive aphasia (not noted supine, increased with activity). Pt very impulsive, poor judgement, insight into deficits, safety awareness and problem solving. Pt required Loc for bed mobility, initially minAx1 to stand EOB with heavy reliance on RW.  Pt began to ambulate to bathroom, however, poor management of RW, BLE beginning to buckle and pt requiring maxA to safely achieve BSC in bathroom. RN arrived for safety. Pt completed toileting with Loc while seated for hygiene, modAx2 to stand from [facility] and mod-maxAx2 with RW to safely return to EOB. Poor motor planning and HIGH fall risk at this time. Recommending rehab at discharge. Current Level of Function Impacting Discharge (ADLs): supervision/set-up maxA    Other factors to consider for discharge: major cognitive deficits, poor safety awareness         PLAN :  Patient continues to benefit from skilled intervention to address the above impairments. Continue treatment per established plan of care to address goals. Recommend with staff: BSC for safety and RW    Recommend next OT session: problem solving with simple ADL, command following    Recommendation for discharge: (in order for the patient to meet his/her long term goals)  Therapy up to 5 days/week in SNF setting    This discharge recommendation:  Has not yet been discussed the attending provider and/or case management    IF patient discharges home will need the following DME: TBD        SUBJECTIVE:   Patient stated I was in [employer] door opened and box head hit a tree box.  Pt with what appears expressive aphasia? With activity; RN notified    OBJECTIVE DATA SUMMARY:   Cognitive/Behavioral Status:  Neurologic State: Alert;Confused  Orientation Level: Oriented to person;Disoriented to situation;Disoriented to time;Disoriented to place  Cognition: Decreased command following; Appropriate for age attention/concentration; Impulsive;Memory loss;Poor safety awareness; Impaired decision making  Perception: Appears intact  Perseveration: Perseverates during conversation  Safety/Judgement: Decreased awareness of need for assistance;Decreased awareness of environment;Decreased awareness of need for safety; Lack of insight into deficits    Functional Mobility and Transfers for ADLs:  Bed Mobility:  Supine to Sit: Minimum assistance  Sit to Supine: Minimum assistance    Transfers:  Sit to Stand: Minimum assistance; Additional time;Assist x1  Functional Transfers  Toilet Transfer : Moderate assistance;Assist x1       Balance:  Sitting: Impaired; Without support  Sitting - Static: Fair (occasional)  Sitting - Dynamic: Fair (occasional)  Standing: Impaired; With support  Standing - Static: Fair;Constant support  Standing - Dynamic : Poor;Constant support    ADL Intervention:                                     Toileting  Bowel Hygiene: Supervision    Cognitive Retraining  Orientation Retraining: Reorienting;Place;Situation;Time  Problem Solving: Awareness of environment; Identifying the task; Identifying the problem  Executive Functions: Regulating behavior  Organizing/Sequencing: Breaking task down  Attention to Task: Distractibility  Safety/Judgement: Decreased awareness of need for assistance;Decreased awareness of environment;Decreased awareness of need for safety; Lack of insight into deficits        Pain:  No c/o pain    Activity Tolerance:   Fair    After treatment patient left in no apparent distress:   Supine in bed, Call bell within reach, Bed / chair alarm activated, and Side rails x 3    COMMUNICATION/COLLABORATION:   The patients plan of care was discussed with: Registered nurse.      Moreno Jesus OT  Time Calculation: 26 mins

## 2022-09-28 NOTE — PROGRESS NOTES
[facility] Adult  Hospitalist Group                                                                                          Hospitalist Progress Note  Mari Lee MD  Answering service: [phone] OR [phone] from in house phone        Date of Service:  2022  NAME:  Tana Riojas  :  10/4/1928  MRN:  [id number]      Admission Summary:   Tana Riojas is a 80 y.o. male who presents with vomiting as per ED chart. Pt is a poor historian he didn't complain of anything . Pt seems to be demented . Called son left , labs reviewed, sepsis was documented by ED but nothing else was done for sepsis. Pt does not appear to be septic as well. Labs and imaging does not seems to have a septic picture. 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  --Possibly obstructive uropathy  --Leave catheter in transition if unable to void and >250 mL and bladder  --IV fluid nephrology following    Nausea/ vomiting -   Supportive care      Code status: DNR  DVT prophylaxis: SCD    Care Plan discussed with: Patient/Family and Nurse  Anticipated Disposition: Home w/Family  Anticipated Discharge: 24 hours to 48 hours     Hospital Problems  Never Reviewed            Codes Class Noted POA    Sepsis ([facility] ICD-10-CM: A41.9  ICD-9-CM: 038.9, 995.91  2022 Unknown           Review of Systems:   A comprehensive review of systems was negative.        Vital Signs: Last 24hrs VS reviewed since prior progress note. Most recent are:  Visit Vitals  /71 (BP 1 Location: Right upper arm, BP Patient Position: Sitting)   Pulse 76   Temp 97.6 °F (36.4 °C)   Resp 18   Ht 6' 2\" (1.88 m)   Wt 62.1 kg (136 lb 14.5 oz)   SpO2 94%   BMI 17.58 kg/m²         Intake/Output Summary (Last 24 hours) at 9/28/2022 1301  Last data filed at 9/28/2022 1058  Gross per 24 hour   Intake 240 ml   Output 250 ml   Net -10 ml          Physical Examination:     I had a face to face encounter with this patient and independently examined them on 9/28/2022 as outlined below:          General : alert x 3, awake, no acute distress, resting in bed, pleasant   HEENT: PEERL, EOMI, moist mucus membrane, TM clear  Neck: supple, no JVD, no meningeal signs  Chest: Clear to auscultation bilaterally   CVS: S1 S2 heard, Capillary refill less than 2 seconds  Abd: soft/ Non tender, non distended, BS physiological,   Ext: no clubbing, no cyanosis, no edema, brisk 2+ DP pulses  Neuro/Psych: pleasant mood and affect, CN 2-12 grossly intact  Skin: warm     Data Review:    I personally reviewed  Image and labs      Labs:     Recent Labs     09/27/22  0522 09/25/22  1604   WBC 12.9* 11.2*   HGB 13.1 14.4   HCT 40.3 44.2    282       Recent Labs     09/28/22  0329 09/27/22  0522 09/25/22  1604    136 138   K 4.4 4.5 4.3    104 100   CO2 20* 22 24   BUN 75* 59* 25*   CREA 4.84* 3.76* 1.32*   GLU 93 79 107*   CA 7.6* 7.9* 8.2*       Recent Labs     09/25/22  1604   ALT 17   AP 49   TBILI 0.7   TP 6.9   ALB 3.6   GLOB 3.3   LPSE 173       No results for input(s): INR, PTP, APTT, INREXT, INREXT in the last 72 hours. No results for input(s): FE, TIBC, PSAT, FERR in the last 72 hours. No results found for: FOL, RBCF   No results for input(s): PH, PCO2, PO2 in the last 72 hours. No results for input(s): CPK, CKNDX, TROIQ in the last 72 hours.     No lab exists for component: CPKMB  No results found for: CHOL, CHOLX, CHLST, CHOLV, HDL, HDLP, LDL, LDLC, DLDLP, TGLX, TRIGL, TRIGP, CHHD, CHHDX  No results found for: [facility]  Lab Results   Component Value Date/Time    Color YELLOW/STRAW 09/25/2022 06:28 PM    Appearance HAZY (A) 09/25/2022 06:28 PM    Specific gravity 1.015 09/25/2022 06:28 PM    pH (UA) 6.0 09/25/2022 06:28 PM    Protein 30 (A) 09/25/2022 06:28 PM    Glucose 100 (A) 09/25/2022 06:28 PM    Ketone 15 (A) 09/25/2022 06:28 PM    Bilirubin Negative 09/25/2022 06:28 PM    Urobilinogen 0.2 09/25/2022 06:28 PM    Nitrites Negative 09/25/2022 06:28 PM    Leukocyte Esterase Negative 09/25/2022 06:28 PM    Epithelial cells FEW 09/25/2022 06:28 PM    Bacteria Negative 09/25/2022 06:28 PM    WBC 0-4 09/25/2022 06:28 PM    RBC 0-5 09/25/2022 06:28 PM         Medications Reviewed:     Current Facility-Administered Medications   Medication Dose Route Frequency    0.9% sodium chloride infusion  150 mL/hr IntraVENous CONTINUOUS    cefepime (MAXIPIME) 1 g in 0.9% sodium chloride (MBP/ADV) 50 mL MBP  1 g IntraVENous Q12H    pantoprazole (PROTONIX) 40 mg in 0.9% sodium chloride 10 mL injection  40 mg IntraVENous DAILY    sodium chloride (NS) flush 5-40 mL  5-40 mL IntraVENous Q8H    sodium chloride (NS) flush 5-40 mL  5-40 mL IntraVENous PRN    acetaminophen (TYLENOL) tablet 650 mg  650 mg Oral Q6H PRN    Or    acetaminophen (TYLENOL) suppository 650 mg  650 mg Rectal Q6H PRN    polyethylene glycol (MIRALAX) packet 17 g  17 g Oral DAILY PRN    ondansetron (ZOFRAN ODT) tablet 4 mg  4 mg Oral Q8H PRN    Or    ondansetron (ZOFRAN) injection 4 mg  4 mg IntraVENous Q6H PRN     ______________________________________________________________________  EXPECTED LENGTH OF STAY: 2d 14h  ACTUAL LENGTH OF STAY:          3      Please note that this dictation was completed with [employer], the computer voice recognition software.   Quite often unanticipated grammatical, syntax, homophones, and other interpretive errors are inadvertently transcribed by the computer software. Please disregard these errors. Please excuse any errors that have escaped final proofreading.                 Alpha Adjutant, MD
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
## 2022-09-28 NOTE — PROGRESS NOTES
Labs noted  Imaging shows BPH without obstruction   Ordered koehler   Will see him later this morning   Continue current IVF

## 2022-09-28 NOTE — PROGRESS NOTES
1700 On assessment, pt is alert and oriented x1. No complaints of pain, VSS. Lung sounds clear on room air. NSR on monitor. 1715 Pt pulled at koehler catheter, but catheter remains in place    1830 150ccs of kaci red blood noted in urine. On call nephrology paged. Orders received to deflate balloon and try to advance into bladder. If met with resistance, remove koehler catheter [facility]    1845 Attempted to advance catheter, but met with resistance and gushing blood around catheter. Koehler catheter removed to prevent further trauma. Jose Alfaro NP notified.     1900 Orders received to place coude catheter per Jose Alfaro NP.

## 2022-09-29 NOTE — PROGRESS NOTES
Physician Progress Note      PATIENT:               Idalia Mccracken  CSN #:                  [id number]  :                       10/4/1928  ADMIT DATE:       2022 3:51 PM  [address] [place] DATE:  RESPONDING  PROVIDER #:        Raymond Hoffman MD          QUERY TEXT:    Pt admitted with vomiting. Pt noted by Hospitalist to have CT scan showing possible atelectasis versus aspiration. Pt was receiving cefepime, with noted antibiotic indication of aspiration pneumonia in the [facility]. If possible, please document if you were evaluating and/or treating any of the following:    Note: CAP and HCAP indicate where the pneumonia was acquired, not a specific type. The medical record reflects the following:    Risk Factors: 80 y.o. male who presents with vomiting, has had worsening aphasia for apparently 2 days, metabolic encephalopathy, possibly dementia    Clinical Indicators:    -- Hospitalist documented :  -CT scan showing possible atelectasis versus aspiration will start cefepime follow cultures blood culture no growth to date urinalysis also has no WBC or possible  - Worsening aphasia for apparently 2 days    -- [facility] documentation = Cefepime Order specific questions: Antibiotic Indications Aspiration Pneumonia    -- CT abd/pelv  =  1. Left lower lobe consolidation may represent lobar pneumonia. 2.  Overall chronic findings in the bilateral dependent lower lungs. Chronic aspiration can be considered. -- CXR  = No acute cardiopulmonary process    -- WBC = 11.2, 12.9, 10.9    Treatment: IV cefepime, imaging studies, supportive care, serial lab monitoring    Thank-you,  Lori Yan RN, [facility]  Clinical   [email or web address]  [phone]  You can also contact me via [employer]. Options provided:  -- Aspiration pneumonia  -- Other pneumonia, please specify, Please specify type of pneumonia.   -- Other - I will add my own diagnosis  -- Disagree - Not applicable / Not valid  -- Disagree - Clinically unable to determine / Unknown  -- Refer to Clinical Documentation Reviewer    PROVIDER RESPONSE TEXT:    Provider is clinically unable to determine a response to this query. Aspiration and other pneumonia seem to be ruled out at this time. Query created by: Kike Cueto on 9/29/2022 3:50 PM      QUERY TEXT:    Patient admitted with BMI of 17.6. The ED Physician noted cachectic ill-appearing elderly male. Pt is receiving nutritional supplements with all meals. The Registered Dietician noted insufficient data to determine malnutrition status. If possible, please document if you are evaluating and/or treating any of the following: The medical record reflects the following:    Risk Factors: 80 y.o. male who presents with vomiting, has had worsening aphasia for apparently 2 days, metabolic encephalopathy, possibly dementia    Clinical Indicators:    -- BMI = 17.6  -- ED Physician documented: Physical exam is remarkable for cachectic ill-appearing elderly male  -- Registered Dietician Malnutrition Assessment: Malnutrition Status: Insufficient data (09/26/22 1506)    Treatment: Registered Dietician consult, nutritional supplements all meals, weight measurement    Thank-you,  Osbaldo Jacob RN, [place]  Clinical   [email or web address]  [phone]  You can also contact me via [address] [place] [address]. ASPEN Criteria:  https://[email or web address] onlinelibrary. simpson. com/doi/full/10.1177/1495078013022842  Options provided:  -- Underweight with BMI 17.6  -- Cachexia with BMI 17.6  -- Cachectic and underweight with BMI 17.6  -- Other - I will add my own diagnosis  -- Disagree - Not applicable / Not valid  -- Disagree - Clinically unable to determine / Unknown  -- Refer to Clinical Documentation Reviewer    PROVIDER RESPONSE TEXT:    This patient is cachectic and underweight with a BMI of 17.6.     Query created by: Kike Cueto on 9/29/2022 4:00 PM      Electronically signed by:   Rosemarie López MD 9/29/2022 6:43 PM
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
## 2022-09-29 NOTE — PROGRESS NOTES
Bedside shift change report given to [address]  (oncoming nurse) by Blaise Wang RN  (offgoing nurse). Report included the following information SBAR, Kardex, MAR, and Recent Results.

## 2022-09-29 NOTE — PROGRESS NOTES
Transition of Care Plan  RUR- 14% Moderate Risk  DISPOSITION: The disposition plan is to transition to a SNF - pending review. F/U with PCP/Specialist    Transport: [facility] - [phone], scheduled for      Patient has been recommended for SNF. CM submitted the following referrals for review:    [facility])  [address] - [phone] - insurance is ONN, unable to accept. (ALL SCRIPT)  [facility] [phone] - unable to accept, no bed available at this time. [facility] [phone] - unable to accept, insurance is OON. Admin coordinator at [facility] reports, unable to accept at this time, due to no bed availability. At 8:12am - CM contacted admin coordinator at [facility] who reports that he has not reviewed referral at this time but will do so and get back with CM after the referral has been reviewed. At 8:14am - CM contacted Jackie Mathew at [facility] to follow up regarding referral, due to insurance being out of network, unable to accept patient. At 8:17am - CM received call back from [facility] [place] at [address] who reports unable to accept at this time because insurance is OON. At 8:19am - 8:29AM - CM contacted patients son to provide the above update, as more choices are needed at this time to submit for review. CM explained the process of SNF. CM provided patients son with the nurses station number per request.    CM received another choice from patients son. At 5:30pm - CM submitted SNF referral to:  [facility]) [phone] - pending review.     Ashlie Phoenix, MSW, CRM, LMHP-e  Available in [employer]

## 2022-09-29 NOTE — PROGRESS NOTES
[facility]  Hospitalist Group                                                                                          Hospitalist Progress Note  Moses Weems MD  Answering service: [phone] from in house phone        Date of Service:  2022  NAME:  Chris Tobin  :  10/4/1928  MRN:  [id number]      Admission Summary:   Chris Tobin is a 80 y.o. male who presents with vomiting as per ED chart. Pt is a poor historian he didn't complain of anything . Pt seems to be demented . Called son left , labs reviewed, sepsis was documented by ED but nothing else was done for sepsis. Pt does not appear to be septic as well. Labs and imaging does not seems to have a septic picture. Interval history / Subjective:   Patient seen and examined earlier this morning by me. Apparent worsening aphasia. Patient pulled Arguelles catheter overnight and had kaci bleeding.  No acute complaints at the time of my exam.      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 Code status: DNR  DVT prophylaxis: SCD    Care Plan discussed with: Patient/Family and Nurse  Anticipated Disposition: Home w/Family  Anticipated Discharge: 24 hours to 48 hours [address] Problems  Never Reviewed            Codes Class Noted POA    Sepsis ([facility] [address] ICD-10-CM: A41.9  ICD-9-CM: 038.9, 995.91  9/25/2022 Unknown         Review of Systems:   A comprehensive review of systems was negative. Vital Signs:    Last 24hrs VS reviewed since prior progress note. Most recent are:  Visit Vitals  /68 (BP 1 Location: Left upper arm, BP Patient Position: At rest)   Pulse 73   Temp 98.2 °F (36.8 °C)   Resp 18   Ht 6' 2\" (1.88 m)   Wt 62.1 kg (136 lb 14.5 oz)   SpO2 96%   BMI 17.58 kg/m²         Intake/Output Summary (Last 24 hours) at 9/29/2022 1348  Last data filed at 9/29/2022 0700  Gross per 24 hour   Intake --   Output 150 ml   Net -150 ml          Physical Examination:     I had a face to face encounter with this patient and independently examined them on 9/29/2022 as outlined below:          General : awake, no acute distress, resting in bed, pleasant   HEENT: PEERL, EOMI, moist mucus membrane, TM clear  Neck: supple, no JVD, no meningeal signs  Chest: Clear to auscultation bilaterally   CVS: S1 S2 heard, Capillary refill less than 2 seconds  Abd: soft/ Non tender, non distended, BS physiological,   Ext: no clubbing, no cyanosis, no edema, brisk 2+ DP pulses  Neuro/Psych: pleasant mood and affect, CN 2-12 grossly intact.  Broken speech with expressive aphasia  Skin: warm     Data Review:    I personally reviewed  Image and labs      Labs:     Recent Labs     09/29/22  0850 09/27/22  0522   WBC 10.9 12.9*   HGB 13.1 13.1   HCT 39.0 40.3    196       Recent Labs     09/29/22  0850 09/28/22  0329 09/27/22  0522    137 136   K 4.4 4.4 4.5   * 106 104   CO2 19* 20* 22   BUN 83* 75* 59*   CREA 5.92* 4.84* 3.76*   GLU 96 93 79   CA 8.1* 7.6* 7.9*       No results for input(s): ALT, AP, TBIL, TBILI, TP, ALB, GLOB, GGT, AML, LPSE in the last 72 hours. No lab exists for component: SGOT, GPT, AMYP, HLPSE    No results for input(s): INR, PTP, APTT, INREXT, INREXT in the last 72 hours. No results for input(s): FE, TIBC, PSAT, FERR in the last 72 hours. No results found for: FOL, RBCF   No results for input(s): PH, PCO2, PO2 in the last 72 hours. No results for input(s): CPK, CKNDX, TROIQ in the last 72 hours.     No lab exists for component: CPKMB  No results found for: CHOL, CHOLX, CHLST, CHOLV, HDL, HDLP, LDL, LDLC, DLDLP, TGLX, TRIGL, TRIGP, CHHD, CHHDX  Lab Results   Component Value Date/Time    Glucose (POC) 97 09/29/2022 10:24 AM     Lab Results   Component Value Date/Time    Color YELLOW/STRAW 09/25/2022 06:28 PM    Appearance HAZY (A) 09/25/2022 06:28 PM    Specific gravity 1.015 09/25/2022 06:28 PM    pH (UA) 6.0 09/25/2022 06:28 PM    Protein 30 (A) 09/25/2022 06:28 PM    Glucose 100 (A) 09/25/2022 06:28 PM    Ketone 15 (A) 09/25/2022 06:28 PM    Bilirubin Negative 09/25/2022 06:28 PM    Urobilinogen 0.2 09/25/2022 06:28 PM    Nitrites Negative 09/25/2022 06:28 PM    Leukocyte Esterase Negative 09/25/2022 06:28 PM    Epithelial cells FEW 09/25/2022 06:28 PM    Bacteria Negative 09/25/2022 06:28 PM    WBC 0-4 09/25/2022 06:28 PM    RBC 0-5 09/25/2022 06:28 PM         Medications Reviewed:     Current Facility-Administered Medications   Medication Dose Route Frequency    0.9% sodium chloride infusion  150 mL/hr IntraVENous CONTINUOUS    cefepime (MAXIPIME) 1 g in 0.9% sodium chloride (MBP/ADV) 50 mL MBP  1 g IntraVENous Q24H    pantoprazole (PROTONIX) 40 mg in 0.9% sodium chloride 10 mL injection  40 mg IntraVENous DAILY    sodium chloride (NS) flush 5-40 mL  5-40 mL IntraVENous Q8H    sodium chloride (NS) flush 5-40 mL  5-40 mL IntraVENous PRN    acetaminophen (TYLENOL) tablet 650 mg  650 mg Oral Q6H PRN    Or    acetaminophen (TYLENOL) suppository 650 mg  650 mg Rectal Q6H PRN    polyethylene glycol (MIRALAX) packet 17 g  17 g Oral DAILY PRN    ondansetron (ZOFRAN ODT) tablet 4 mg  4 mg Oral Q8H PRN    Or    ondansetron (ZOFRAN) injection 4 mg  4 mg IntraVENous Q6H PRN     ______________________________________________________________________  EXPECTED LENGTH OF STAY: 2d 14h  ACTUAL LENGTH OF STAY:          4      Please note that this dictation was completed with [employer], the computer voice recognition software. Quite often unanticipated grammatical, syntax, homophones, and other interpretive errors are inadvertently transcribed by the computer software. Please disregard these errors. Please excuse any errors that have escaped final proofreading.                 Mayra Darden MD

## 2022-09-29 NOTE — PROGRESS NOTES
Problem: Self Care Deficits Care Plan (Adult)  Goal: *Acute Goals and Plan of Care (Insert Text)  Description: FUNCTIONAL STATUS PRIOR TO ADMISSION: Patient was independent and active without use of DME.     HOME SUPPORT: The patient lived alone with son in area to provide assistance. Occupational Therapy Goals  Initiated 9/26/2022  1. Patient will perform grooming with supervision/set-up within 7 day(s). 2.  Patient will perform lower body dressing with supervision/set-up within 7 day(s). 3.  Patient will perform bathing with supervision/set-up within 7 day(s). 4.  Patient will perform toilet transfers with supervision/set-up within 7 day(s). 5.  Patient will perform all aspects of toileting with supervision/set-up within 7 day(s). 6.  Patient will participate in upper extremity therapeutic exercise/activities with supervision/set-up for 5 minutes within 7 day(s). 7.  Patient will utilize energy conservation techniques during functional activities with verbal cues within 7 day(s). Outcome: Not Progressing Towards Goal     OCCUPATIONAL THERAPY TREATMENT  Patient: Ramsey Conrad (76 y.o. male)  Date: 9/29/2022  Diagnosis: Sepsis ([facility] [address] [A41.9] <principal problem not specified>      Precautions: Fall, DNR, Bed Alarm  Chart, occupational therapy assessment, plan of care, and goals were reviewed. ASSESSMENT  Patient continues with skilled OT services and is not progressing towards goals. Patient received in supine and noted to have increased difficulty speaking, ? Expressive aphasia, minimal intelligible words, using wild gestures to try and get his point across. Of note, OT yesterday noted to have expressive aphasia and poor motor planning which was change from initial evaluation. In reviewing OT notes he required increased assist for transfers from SBA to min assist of 1 to mod to max assist of 2. During tx, MD in to assess patient and reported above.  Patient was able to follow simple one step commands, ie: put your glasses on, wash your face, however unable to follow functional commands with max multimodal cues to scoot up to head of bed. When asked if having difficulty saying what he wants to say, nodded head and said \"yes\"  RN aware and MD in to assess patient. Current Level of Function Impacting Discharge (ADLs): max assist of 2 to scoot to head of bed due to poor motor planning    Other factors to consider for discharge: head CT ordered this date         PLAN :  Patient continues to benefit from skilled intervention to address the above impairments. Continue treatment per established plan of care to address goals. Recommend with staff: SLP consult, supervision for eating     Recommend next OT session: follow up on CT results, ? MRI ordered, ADLs    Recommendation for discharge: (in order for the patient to meet his/her long term goals)  To be determined: ? Rehab vs SNF pending change in status/diagnosis, patient was independent and active, living alone prior to admission    This discharge recommendation:  Has not yet been discussed the attending provider and/or case management    IF patient discharges home will need the following DME:        SUBJECTIVE:   Patient stated I am too.  stated after saying \"I'm sorry your having a hard time\" able to speak a few words clearly    OBJECTIVE DATA SUMMARY:   Cognitive/Behavioral Status:  Neurologic State: Alert  Orientation Level: Unable to verbalize (difficult to assess due to aphasia)  Cognition: Decreased command following (noted motor apraxia)  Perception: Appears intact  Perseveration: Perseverates during conversation  Safety/Judgement: Insight into deficits (expressed agreement that speech different)    Functional Mobility and Transfers for ADLs:  Bed Mobility:  Scooting: Maximum assistance;Assist x2; Other (comment) (due to decreased understanding of instruction)  Lowered head of bed and cued to push himself to head of bed, attempted to wiggle a bit. Cued to bend legs and use legs, unable to facilitate bridge position with max physical assist, verbal cues, perseverated on holding LE's in air versus pushing into bed. Decreased motor planning noted     ADL Intervention:   Patient trying to convey something to therapist however only able to understand a few words, using hands and gesturing to attempt to increase therapist understanding. Able to to repeat words and name objects held up in room. Grooming  Grooming Assistance: Set-up  Position Performed: Other (comment) (in supine, head of bed elevated)  Washing Face: Set-up  Placed washcloth in his right hand and cued to wash his face, performed   Handed glasses and asked to put on and followed command       Cognitive Retraining  Safety/Judgement: Insight into deficits (expressed agreement that speech different)      Pain:  No complaint    Activity Tolerance:   Fair    After treatment patient left in no apparent distress:   Supine in bed and Call bell within reach    COMMUNICATION/COLLABORATION:   The patients plan of care was discussed with: Physical therapist, Registered nurse, Physician, and Case management.      Leopold Luster, OTR/L  Time Calculation: 25 mins

## 2022-09-30 PROBLEM — G93.41 METABOLIC ENCEPHALOPATHY: Status: ACTIVE | Noted: 2022-01-01

## 2022-09-30 NOTE — PROGRESS NOTES
[address] [facility] Adult  Hospitalist Group                                                                                          Hospitalist Progress Note  Salinas Haas MD  Answering service: [phone] OR 36 from in house phone        Date of Service:  2022  NAME:  Calvin Farmer  :  10/4/1928  MRN:  [id number]      Admission Summary:   Calvin Farmer is a 80 y.o. male who presents with vomiting as per ED chart. Pt is a poor historian he didn't complain of anything . Pt seems to be demented . Called son left , labs reviewed, sepsis was documented by ED but nothing else was done for sepsis. Pt does not appear to be septic as well. Labs and imaging does not seems to have a septic picture. Interval history / Subjective:   Patient seen and examined earlier this morning by me. Patient this morning more aphasic, with twitching, and some tremors. He does seem to understand. No acute events overnight.      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  Nephrology also spoke to the patient's son regarding the patient not being a candidate for dialysis. Patient's family agreed at this point to transition towards comfort care and inpatient hospice. Inpatient hospice consult has been placed. Appreciate nephrology and palliative care recommendations. Code status: DNR  DVT prophylaxis: SCD    Care Plan discussed with: Patient/Family and Nurse  Anticipated Disposition: Home w/Family  Anticipated Discharge: Less than 24 hours. Hospice     Hospital Problems  Never Reviewed            Codes Class Noted POA    Sepsis [facility]) ICD-10-CM: A41.9  ICD-9-CM: 038.9, 995.91  9/25/2022 Unknown         Review of Systems:   A comprehensive review of systems was negative. Vital Signs:    Last 24hrs VS reviewed since prior progress note. Most recent are:  Visit Vitals  BP (!) 125/91   Pulse 72   Temp 97.7 °F (36.5 °C)   Resp 20   Ht 6' 2\" (1.88 m)   Wt 62.1 kg (136 lb 14.5 oz)   SpO2 98%   BMI 17.58 kg/m²         Intake/Output Summary (Last 24 hours) at 9/30/2022 1329  Last data filed at 9/30/2022 0531  Gross per 24 hour   Intake 240 ml   Output 250 ml   Net -10 ml          Physical Examination:     I had a face to face encounter with this patient and independently examined them on 9/30/2022 as outlined below:          General : Awake. Aphasic but seems to understand. HEENT: PEERL, EOMI, moist mucus membrane, TM clear  Neck: supple, no JVD, no meningeal signs  Chest: Clear to auscultation bilaterally   CVS: S1 S2 heard, Capillary refill less than 2 seconds  Abd: soft/ Non tender, non distended, BS physiological,   Ext: no clubbing, no cyanosis, no edema, brisk 2+ DP pulses  Neuro/Psych: pleasant mood and affect, CN 2-12 grossly intact. Broken speech with expressive aphasia. Tremors/jerking and twitching of facial muscles.   Skin: warm     Data Review:    I personally reviewed  Image and labs      Labs:     Recent Labs     09/30/22  0858 09/29/22  0850   WBC 9.6 10.9   HGB 12.1 13.1   HCT 35.9* 39.0    217       Recent Labs     09/30/22  0859 09/29/22  0850 09/28/22  0329    137 137   K 4.9 4.4 4.4   * 111* 106   CO2 21 19* 20*   BUN 96* 83* 75*   CREA 6.66* 5.92* 4.84*   GLU 88 96 93   CA 8.0* 8.1* 7.6*   PHOS 4.2  --   --        Recent Labs     09/30/22  0859   ALB 1.8*       No results for input(s): INR, PTP, APTT, INREXT, INREXT in the last 72 hours. No results for input(s): FE, TIBC, PSAT, FERR in the last 72 hours. No results found for: FOL, RBCF   No results for input(s): PH, PCO2, PO2 in the last 72 hours. No results for input(s): CPK, CKNDX, TROIQ in the last 72 hours.     No lab exists for component: CPKMB  No results found for: CHOL, CHOLX, CHLST, CHOLV, HDL, HDLP, LDL, LDLC, DLDLP, TGLX, TRIGL, TRIGP, CHHD, CHHDX  Lab Results   Component Value Date/Time    Glucose (POC) 97 09/29/2022 10:24 AM     Lab Results   Component Value Date/Time    Color YELLOW/STRAW 09/25/2022 06:28 PM    Appearance HAZY (A) 09/25/2022 06:28 PM    Specific gravity 1.015 09/25/2022 06:28 PM    pH (UA) 6.0 09/25/2022 06:28 PM    Protein 30 (A) 09/25/2022 06:28 PM    Glucose 100 (A) 09/25/2022 06:28 PM    Ketone 15 (A) 09/25/2022 06:28 PM    Bilirubin Negative 09/25/2022 06:28 PM    Urobilinogen 0.2 09/25/2022 06:28 PM    Nitrites Negative 09/25/2022 06:28 PM    Leukocyte Esterase Negative 09/25/2022 06:28 PM    Epithelial cells FEW 09/25/2022 06:28 PM    Bacteria Negative 09/25/2022 06:28 PM    WBC 0-4 09/25/2022 06:28 PM    RBC 0-5 09/25/2022 06:28 PM         Medications Reviewed:     Current Facility-Administered Medications   Medication Dose Route Frequency    haloperidol (HALDOL) 2 mg/mL oral solution 2 mg  2 mg SubLINGual Q6H PRN    Or    haloperidol lactate (HALDOL) injection 2 mg  2 mg IntraVENous Q6H PRN    LORazepam (INTENSOL) 2 mg/mL oral concentrate 1 mg  1 mg Oral Q1H PRN    glycopyrrolate (ROBINUL) injection 0.2 mg  0.2 mg IntraVENous Q4H PRN    HYDROmorphone (DILAUDID) injection 0.5 mg  0.5 mg IntraVENous Q15MIN PRN    sodium chloride (NS) flush 5-40 mL  5-40 mL IntraVENous Q8H    sodium chloride (NS) flush 5-40 mL  5-40 mL IntraVENous PRN    acetaminophen (TYLENOL) tablet 650 mg  650 mg Oral Q6H PRN    Or    acetaminophen (TYLENOL) suppository 650 mg  650 mg Rectal Q6H PRN    polyethylene glycol (MIRALAX) packet 17 g  17 g Oral DAILY PRN    ondansetron (ZOFRAN ODT) tablet 4 mg  4 mg Oral Q8H PRN    Or    ondansetron (ZOFRAN) injection 4 mg  4 mg IntraVENous Q6H PRN     ______________________________________________________________________  EXPECTED LENGTH OF STAY: 3d 16h  ACTUAL LENGTH OF STAY:          5      Please note that this dictation was completed with [employer], the computer voice recognition software. Quite often unanticipated grammatical, syntax, homophones, and other interpretive errors are inadvertently transcribed by the computer software. Please disregard these errors. Please excuse any errors that have escaped final proofreading.                 Mayra Darden MD
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
## 2022-09-30 NOTE — HOSPICE
[facility]  [phone]    Patient Name: Meliza Car  YOB: 1928  Age: 80 y.o. [facility]  Note:  Hospice consult noted. Chart reviewed. Plan of care discussed with patients nurse & care manager. In to meet with pt, who is confused and agitated and his son Grzegorz Batista. Discussed Hospice philosophy, general plan of care, levels of care, services and on call procedures. Pt will be admitted to in patient hospice at the [facility] LOC. Family information packet provided & reviewed. Thank you for the opportunity to be of service to Mr. Eliud Liu and his son Grzegorz Batista.      Alma Krueger [facility], [address] [place] [address] Qbtucu [phone]
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
## 2022-09-30 NOTE — PROGRESS NOTES
Problem: Falls - Risk of  Goal: *Absence of Falls  Description: Document Bradley Newton Fall Risk and appropriate interventions in the flowsheet. Note: Fall Risk Interventions:  Mobility Interventions: Communicate number of staff needed for ambulation/transfer    Mentation Interventions: Door open when patient unattended         Elimination Interventions:  Toilet paper/wipes in reach              Problem: Patient Education: Go to Patient Education Activity  Goal: Patient/Family Education  Outcome: Progressing Towards Goal

## 2022-09-30 NOTE — PROGRESS NOTES
Spiritual Care Assessment/Progress Note  [facility]      NAME: Tomi Cunha      MRN: [id number]  AGE: 80 y.o. SEX: male  Alevism Affiliation: No preference   Language: English     9/30/2022     Total Time (in minutes): 10     Spiritual Assessment begun in [facility] through conversation with:         []Patient        [] Family    [] Friend(s)        Reason for Consult: Palliative Care, Initial/Spiritual Assessment     Spiritual beliefs: (Please include comment if needed)     [] Identifies with a argenis tradition:         [] Supported by a argenis community:            [] Claims no spiritual orientation:           [] Seeking spiritual identity:                [] Adheres to an individual form of spirituality:           [x] Not able to assess:                           Identified resources for coping:      [] Prayer                               [] Music                  [] Guided Imagery     [] Family/friends                 [] Pet visits     [] Devotional reading                         [x] Unknown     [] Other:                                               Interventions offered during this visit: (See comments for more details)                Plan of Care:     [] Support spiritual and/or cultural needs    [] Support AMD and/or advance care planning process      [] Support grieving process   [] Coordinate Rites and/or Rituals    [] Coordination with community clergy   [x] No spiritual needs identified at this time   [] Detailed Plan of Care below (See Comments)  [] Make referral to Music Therapy  [] Make referral to Pet Therapy     [] Make referral to Addiction services  [] Make referral to [facility]  [] Make referral to Spiritual Care Partner  [] No future visits requested        [] Contact Spiritual Care for further referrals     Comments: Attempted initial palliative care spiritual care assessment. Mr. Luanne Evans was unavailable due to his medical condition.     Spiritual Care remains available as needed. Rev. Mercy Chi M.Div, [facility] Specialist
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
## 2022-09-30 NOTE — PROGRESS NOTES
Problem: Falls - Risk of  Goal: *Absence of Falls  Description: Document Bradley Newton Fall Risk and appropriate interventions in the flowsheet. Outcome: Not Progressing Towards Goal  Note: Fall Risk Interventions:                                Problem: Pressure Injury - Risk of  Goal: *Prevention of pressure injury  Description: Document Jani Scale and appropriate interventions in the flowsheet.   Outcome: Not Progressing Towards Goal  Note: Pressure Injury Interventions:                                            Problem: Dyspnea Due to End of Life  Goal: Demonstrate understanding of and ability to manage respiratory symptoms at end of life  Outcome: Not Progressing Towards Goal     Problem: Communication Deficit  Goal: Effectively communicate symptoms, needs, and concerns  Outcome: Not Progressing Towards Goal     Problem: Breathing Pattern - Ineffective  Goal: *Use of effective breathing techniques  Outcome: Not Progressing Towards Goal     Problem: Pain  Goal: *Control of acute pain  Outcome: Not Progressing Towards Goal     Problem: Seizure Disorder (Adult)  Goal: *STG: Remains free of seizure activity  Outcome: Progressing Towards Goal  Goal: Interventions  Outcome: Progressing Towards Goal  Note: Provide PRN medications for anxiety/agitation  Frequent rounding for pt safety/ comfort and family support      Problem: Infection - Risk of, Urinary Catheter-Associated Urinary Tract Infection  Goal: *Absence of infection signs and symptoms  Outcome: Not Progressing Towards Goal     Problem: Agitation/Terminal Delerium-Palliative Care  Goal: *PALLIATIVE CARE-Alleviation of agitation  Outcome: Not Progressing Towards Goal

## 2022-09-30 NOTE — PROGRESS NOTES
Bedside and Verbal shift change report given to [address] [place] [address] (oncoming nurse) by Misha Webster (offgoing nurse). Report included the following information SBAR, Kardex, Intake/Output, and MAR.

## 2022-09-30 NOTE — DISCHARGE SUMMARY
Discharge Summary       PATIENT ID: Rakesh Mccann  MRN: [id number]   YOB: 1928    DATE OF ADMISSION: 9/25/2022  3:51 PM    DATE OF DISCHARGE: 9/30/22   PRIMARY CARE PROVIDER: Mehnaz Solares MD     ATTENDING PHYSICIAN: Melissa Jordan MD  DISCHARGING PROVIDER: Melissa Jordan MD    To contact this individual call [phone] and ask the  to page. If unavailable ask to be transferred the Adult Hospitalist Department. CONSULTATIONS: IP CONSULT TO HOSPITALIST  IP CONSULT TO NEPHROLOGY  IP CONSULT TO UROLOGY  IP CONSULT TO PALLIATIVE CARE - PROVIDER    PROCEDURES/SURGERIES: * No surgery found *    ADMITTING [address] [place] [address] COURSE:   Rakesh Mccann is a 80 y.o. male who presents with vomiting as per ED chart. Pt is a poor historian he didn't complain of anything . Pt seems to be demented . Called son left VM, labs reviewed, sepsis was documented by ED but nothing else was done for sepsis. Pt does not appear to be septic as well. Labs and imaging does not seems to have a septic picture. The patient denies any fever, chills, chest pain, cough, congestion, recent illness, palpitations, or dysuria. Nausea/ vomiting -   Supportive care  IVF Admit to medical  WBC elevated mild elevation from n/v  Do not agree with sepsis vitals stable     DEENA  -- cont IVF repeat lab     Dementia/ metabolic encephalopathy ? -- unclear etiology     Hospital course  Seen by nephrology. Worsening mental status and aphasia. ATN with worseining kidneys. Not a candidate for HD. Palliative consulted and family agreeable to hospice. Discharged to inpatient hospice. DISCHARGE DIAGNOSES / PLAN:       Metabolic encephalopathy possibly dementia plus uremic encephalopathy  DEENA on CKD with acidosis due to ATN  BPH with some obstructive uropathy  Nausea/ vomiting    DC to inpatient hospice.        PENDING TEST RESULTS:   At the time of discharge the following test results are still pending: none    FOLLOW UP APPOINTMENTS:    Follow-up Information       Follow up With Specialties Details Why MD [address]  [address] [place] [address] [id number]               ADDITIONAL CARE RECOMMENDATIONS: none    DIET: Regular Diet    ACTIVITY: Activity as tolerated    WOUND CARE: none    EQUIPMENT needed: none      DISCHARGE MEDICATIONS:  There are no discharge medications for this patient. NOTIFY YOUR PHYSICIAN FOR ANY OF THE FOLLOWING:   Fever over 101 degrees for 24 hours. Chest pain, shortness of breath, fever, chills, nausea, vomiting, diarrhea, change in mentation, falling, weakness, bleeding. Severe pain or pain not relieved by medications. Or, any other signs or symptoms that you may have questions about. DISPOSITION:    Home With:   OT  PT  HH  RN       Long term SNF/Inpatient Rehab    Independent/assisted living   X Hospice    Other:       PATIENT CONDITION AT DISCHARGE:     Functional status   X Poor     Deconditioned     Independent      Cognition     Lucid    X Forgetful     Dementia      Catheters/lines (plus indication)    Arguelles     PICC     PEG    X None      Code status     Full code    X DNR      PHYSICAL EXAMINATION AT DISCHARGE:  General : Awake. Aphasic but seems to understand. HEENT: PEERL, EOMI, moist mucus membrane, TM clear  Neck: supple, no JVD, no meningeal signs  Chest: Clear to auscultation bilaterally   CVS: S1 S2 heard, Capillary refill less than 2 seconds  Abd: soft/ Non tender, non distended, BS physiological,   Ext: no clubbing, no cyanosis, no edema, brisk 2+ DP pulses  Neuro/Psych: pleasant mood and affect, CN 2-12 grossly intact. Broken speech with expressive aphasia. Tremors/jerking and twitching of facial muscles.   Skin: warm     CHRONIC MEDICAL DIAGNOSES:  Problem List as of 9/30/2022 Never Reviewed            Codes Class Noted - Resolved    Metabolic encephalopathy ICN-16-WT: G93.41  ICD-9-CM: 348.31 9/30/2022 - Present        Sepsis ([facility] [address] ICD-10-CM: A41.9  ICD-9-CM: 038.9, 995.91  9/25/2022 - Present           Greater than 30 minutes were spent with the patient on counseling and coordination of care    Signed:   Olga Motta MD  9/30/2022  5:15 PM

## 2022-09-30 NOTE — PROGRESS NOTES
Pharmacy Note     Cefepime 1000 mg IV q24h ordered for treatment of UTI, Aspiration pneumonia. Per [facility] Renal / Extended Infusion B Lactam Policy, Cefepime will be changed to 2000 mg IV push x 1 followed by Cefepime 1000 mg IV q24h. Estimated Creatinine Clearance: Estimated Creatinine Clearance: 6.8 mL/min (A) (based on SCr of 5.92 mg/dL (H)). Dialysis Status, DEENA, CKD: DEENA on CKD    BMI:  Body mass index is 17.58 kg/m². Recent Labs     22  0850   WBC 10.9     Temp (24hrs), Av °F (36.7 °C), Min:97.5 °F (36.4 °C), Max:98.5 °F (36.9 °C)      Rationale for Adjustment:  Extended infusion policy. Pharmacy will continue to monitor and adjust dose as necessary. Please call with any questions.     Thank you,  Deirdre Yang

## 2022-09-30 NOTE — PROGRESS NOTES
Transition of Care Plan  RUR- 14% Moderate Risk  DISPOSITION: The disposition plan is to transition to transition to hospice (Jerson Joshi to evaluate) referral sent at 1:03pm by CM  F/U with PCP/Specialist    Transport: [facility] - [phone], scheduled for      Patient has been recommended for SNF. CM submitted the following referrals for review: All Script:  [facility]) [phone] - unable to accept, OON. [facility] - [phone] - unable to accept, no beds available. [facility] - [phone] - accepted patient, will need insurance auth, facility will complete this. At 8:09am - CM contacted the admin coordinator at the [facility], to follow up regarding the status of the referral. CM left a VM and awaiting callback. At 8:10am - CM contacted admin coordinator at [facility] [facility] to follow up on the status of the referral. CM left callback information and awaiting callback. At 8:14am - CM contacted admin coordinator at [employer] to follow up on the status of the referral. CM spoke with Rutherford Person who reports that she can accept and patient will need insurance auth. CM to follow up after the 11:00am IDR's this morning. At 9:17am - CM received a call from admin coordinator at [facility], who reports and confirms that unable to accept at this time, there are no beds available. At 10:23am - CM contacted admin coordinator at the [facility] to follow up regarding the referral that was submitted for review, unable to accept because insurance is OON. At 10:25am - CM contacted patients son and provided the most recent update. At 12:58pm - CM contacted Saritha admin coordinator at [facility] [employer] [facility] to provide below update. At 12:59pm - CM acknowledged IP CM consult and submitted referral to [address] [place] [address] for review. CM completed IP CM consult.     SHAHAB Turpin, FAB, LMHP-e  Available in Perfect Serve

## 2022-09-30 NOTE — PROGRESS NOTES
Problem: Falls - Risk of  Goal: *Absence of Falls  Description: Document Spike Blight Fall Risk and appropriate interventions in the flowsheet.   Outcome: Progressing Towards Goal  Note: Fall Risk Interventions:

## 2022-09-30 NOTE — HOSPICE
[facility] Help to Those in Need  [phone]     Patient Name: Roland Pereira  YOB: 1928  Age: 80 [facility]. [facility]  Hospice consult received, reviewing chart. Will follow up with Unit Nurse and Care Manager to discuss plan of care, patient status and discharge disposition. Update received from Dr. Corrinne Distel. Per Palliative, family would like to meet with Hospice this afternoon around 16:00. Thank you for the opportunity to be of service to this patient. 15:30: Bedside patient who is restless, full body tremors and calling out. Bedside nurse providing ADL care. Hospice NP bedside for evaluation. Reviewed with Dr. Skylar Ross for Hospice plan of care. Pt meets GIP LOC for inpatient hospice services at [facility] or [facility]. Currently, [facility] does not have a bed available. Plan to meet with patient son. Confirmed with phone call bedside meeting around 16:15 today. Son, states he is understanding of Hospice philosophy. Will review Hospice plan of care with son when he arrives to [facility]. If he is in agreement, plan to admit patient into Hospice services today at [facility] under GIP.    16:30 Bedside information visit with patient son, Andrea Pro NP and Alma Krueger LCSW. Son in agreement with Hospice services. Discharge order received from Dr. Piter Menard via Perfect Serve. Bed Placement notified; spoke with Macy Baker, [address] [place] [address].     Gabbi Lopez RN, [place] [address] Nurse Liaison  [phone] Mobile  [phone] Office  Available on Perfect Serve
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
## 2022-10-02 NOTE — PROGRESS NOTES
Spiritual Care Assessment/Progress Note  [facility]      NAME: Cally Genao      MRN: [id number]  AGE: 80 y.o. SEX: male  Muslim Affiliation: No preference   Language: English     10/2/2022     Total Time (in minutes): 28     Spiritual Assessment begun in [facility] 2N MED SURG through conversation with:         []Patient        [] Family    [] Friend(s)        Reason for Consult: Death, Inpatient     Spiritual beliefs: (Please include comment if needed)     [] Identifies with a argenis tradition:         [] Supported by a argenis community:            [] Claims no spiritual orientation:           [] Seeking spiritual identity:                [] Adheres to an individual form of spirituality:           [x] Not able to assess:                           Identified resources for coping:      [] Prayer                               [] Music                  [] Guided Imagery     [] Family/friends                 [] Pet visits     [] Devotional reading                         [x] Unknown     [] Other:                                               Interventions offered during this visit: (See comments for more details)    Patient Interventions: Other (comment) (Death)           Plan of Care:     [] Support spiritual and/or cultural needs    [] Support AMD and/or advance care planning process      [] Support grieving process   [] Coordinate Rites and/or Rituals    [] Coordination with community clergy   [] No spiritual needs identified at this time   [] Detailed Plan of Care below (See Comments)  [] Make referral to Music Therapy  [] Make referral to Pet Therapy     [] Make referral to Addiction services  [] Make referral to [facility]  [] Make referral to Spiritual Care Partner  [] No future visits requested        [x] Follow up visits as needed     Visited in response to notification of patient's death. No family present. His nurse reports that the doctor is attempting to reach family now.  Requested  be paged when family arrives.    Chaplain Lynn MDiv, MS, [facility]

## 2022-10-02 NOTE — HOSPICE
[facility]  [phone]    Discharge/Death Nursing Note   Patient Name: Chris Tobin  YOB: 1928  Age: 80 y.o. Date of Death: 10/02/22  Admitted Date: 2022  Time of Death: [address] of Care: [facility]  Level of Care: [facility]  Patient Room: 201/01     Hospice Attending: Eileen Kim MD  Hospice Diagnosis: Metabolic encephalopathy [C59.52]    Death Elham Friday of death completed by: Fabiola Baum RN Pronounced under the supervision of  Dr. Hi Mckeon staff WAS present at the time of death    At the time of death the patient was documented as:    Death Pronouncement Note:  Called to examine patient who has . No response to verbal and tactile stimuli. No respiratory effort. Absent heart sounds and pulses. Pupils fixed and dilated. Patient pronounced dead at 09:12  Pronounced under the supervision of  Dr. Theresa Villatoro     No Family at bedside; however, family notified and in route to [facility]. The pt  within [facility]     The following were notified of the patient's death: Hospice Team, [facility] Nurse Supervisor, Family is on way to [facility]. Discharge Summary   Discharge Reason: Death    Summary of Care Provided:    [x] Post mortem care provided by [address] [facility] Nursing Staff  [x] Notification of  home by [facility] [address] [place] [facility] Nursing Staff  [x] Referrals/Community resources provided:   [x] Goals completed  [] Durable Medical Equipment vendor notified     Disciplines involved: [x] RN [x] SW [x]  [] PAK [] Vol [] PT [] OT [] ST [] [facility]    [] IDT communication/notification    Attending Physician, Dr. Jamila Hurley notified of notified of death; On Call Hospice Provider, Arnie Dakin, NP also notified.     Bereaved   Patient's son, Chris Tobin and pt daughter, Vianey Verde Moderate Bereavement    Advance Care Planning 2022   Confirm Advance Directive Yes, on file Does the patient have other document types Do Not Resuscitate; Power of [address], [address] [address] Nurse Liaison  [phone] Mobile  [phone] Office  Available on Perfect Serve

## 2022-10-02 NOTE — HOSPICE
Death Pronouncement Note:     Called to examine patient who has . No response to verbal and tactile stimuli. No respiratory effort. Absent heart sounds and pulses. Pupils fixed and dilated. Patient pronounced dead at 09:12  Pronounced under the supervision of  Dr. Myra Maurice     No Family at bedside; however, family notified and in route to [facility].     Teo Barnes RN, [place] [address] Nurse Liaison  [phone] Mobile  [phone] Office

## 2022-10-02 NOTE — PROGRESS NOTES
Death Declaration    Pt Name  Melissa Gamboa date:  9/30/2022   Date and time of death:  10/2/22  @ 900 hr   Room Number  201/01    Medical Record Number  [id number] @ . [address]   Age  80 y.o. Date of Birth 10/4/1928   PCP Mercy Velásquez MD   Attending physician Izabella Del Cid MD      Code Status  DNR    Patient seen and examined     Mental status   Unresponsive    Pupils Dilated and Fixed    Respiration Nil    Pulse  Absent     Heart Sounds  Absent    Attending doctor  Notified by myself - via perfectserve text message    Family  To be notified by Nursing staff    Chaplan Service  Notified by Nursing staff     Death certificate and discharge summary completion remain  Dr. Izabella Del Cid MD's responsibility.    Lien Mensah MD MPH  FACP                               10/2/2022

## 2022-10-03 NOTE — HOSPICE
[address] [facility] LCSW Bereavement/Condolence Call: This LCSW called pts son Shad Castro to offer condolences and support. LCSW offered [address] information for ongoing support. LCSW sent son a check list of things to do when a loved one passes.  Son was a FH, appreciated call and checklist.       [facility]    [phone]

## 2022-10-04 NOTE — DISCHARGE SUMMARY
Hospice Discharge Summary    [address] [place] [address]  [facility]        Date of Admission: 9/30/2022  Date of Discharge: 10/2/2022    Iker Rios is a 80y.o. year old who was admitted to [address] [place] [address] at [facility] with a Hospice diagnosis of Metabolic encephalopathy [L22.11]. The patient's care was focused on comfort and the patient passed away on 10/2/2022.

## 2024-09-03 NOTE — PROGRESS NOTES
Deferred visit: The patient was up with OT this morning and reportedly is confused and moves very haphazardly. He is in the bed on arrival and when I talked with him this afternoon he reports he is quiet and going to bed. He was rather adamant so I am deferring PT for today. Spoke with pt regarding recommendations. Pt stated that she will call to schedule appt with Dr. Jesus.